# Patient Record
Sex: FEMALE | Race: WHITE | Employment: FULL TIME
[De-identification: names, ages, dates, MRNs, and addresses within clinical notes are randomized per-mention and may not be internally consistent; named-entity substitution may affect disease eponyms.]

---

## 2017-09-03 ENCOUNTER — HEALTH MAINTENANCE LETTER (OUTPATIENT)
Age: 37
End: 2017-09-03

## 2018-05-10 ENCOUNTER — OFFICE VISIT (OUTPATIENT)
Dept: ENDOCRINOLOGY | Facility: CLINIC | Age: 38
End: 2018-05-10
Payer: COMMERCIAL

## 2018-05-10 VITALS
DIASTOLIC BLOOD PRESSURE: 76 MMHG | HEART RATE: 80 BPM | WEIGHT: 214.7 LBS | BODY MASS INDEX: 35.77 KG/M2 | HEIGHT: 65 IN | RESPIRATION RATE: 16 BRPM | SYSTOLIC BLOOD PRESSURE: 122 MMHG | TEMPERATURE: 98.6 F

## 2018-05-10 DIAGNOSIS — E11.9 TYPE 2 DIABETES MELLITUS WITHOUT COMPLICATION, WITHOUT LONG-TERM CURRENT USE OF INSULIN (H): Primary | ICD-10-CM

## 2018-05-10 DIAGNOSIS — R73.9 HYPERGLYCEMIA: ICD-10-CM

## 2018-05-10 LAB
GLUCOSE SERPL-MCNC: 170 MG/DL (ref 70–99)
INSULIN SERPL-ACNC: 35.2 MU/L (ref 3–25)

## 2018-05-10 PROCEDURE — 99204 OFFICE O/P NEW MOD 45 MIN: CPT | Performed by: CLINICAL NURSE SPECIALIST

## 2018-05-10 PROCEDURE — 84681 ASSAY OF C-PEPTIDE: CPT | Performed by: CLINICAL NURSE SPECIALIST

## 2018-05-10 PROCEDURE — 82947 ASSAY GLUCOSE BLOOD QUANT: CPT | Performed by: CLINICAL NURSE SPECIALIST

## 2018-05-10 PROCEDURE — 36415 COLL VENOUS BLD VENIPUNCTURE: CPT | Performed by: CLINICAL NURSE SPECIALIST

## 2018-05-10 PROCEDURE — 83525 ASSAY OF INSULIN: CPT | Performed by: CLINICAL NURSE SPECIALIST

## 2018-05-10 RX ORDER — GLUCOSAMINE HCL/CHONDROITIN SU 500-400 MG
CAPSULE ORAL
COMMUNITY
Start: 2018-04-05 | End: 2019-05-15

## 2018-05-10 RX ORDER — LANCETS
EACH MISCELLANEOUS
COMMUNITY
Start: 2018-04-05 | End: 2019-05-15

## 2018-05-10 RX ORDER — METFORMIN HCL 500 MG
2000 TABLET, EXTENDED RELEASE 24 HR ORAL
Qty: 60 TABLET | Refills: 1 | Status: SHIPPED | OUTPATIENT
Start: 2018-05-10 | End: 2018-07-02

## 2018-05-10 RX ORDER — DIPHENHYDRAMINE HYDROCHLORIDE 25 MG/1
CAPSULE, LIQUID FILLED ORAL
COMMUNITY
Start: 2018-03-31 | End: 2019-05-15

## 2018-05-10 RX ORDER — BLOOD-GLUCOSE CONTROL, NORMAL
1 EACH MISCELLANEOUS
COMMUNITY
Start: 2018-04-05 | End: 2019-05-15

## 2018-05-10 NOTE — LETTER
5/10/2018         RE: Donna Jarvis  [address]  [facility] [address]        Dear Colleague,    Thank you for referring your patient, Donna Jarvis, to the [facility]. Please see a copy of my visit note below.    Name: Donna Jarvis  Seen at the request of no referring provider for Diabetes.  HPI:  Donna Jarvis is a 37 year old female who presents for the evaluation/management of:    1. Type 2 DM:  Originally diagnosed: recently.  History of GD x3.  Treated with glyburide during the first pregnancy but this was discontinued during the pregnancy due to frequent hypoglycemia and she was diet treated the rest of the pregnancy.  Second and third pregnancy were insulin treated.  Current Regimen: Metformin  mg qd  BS checks: 0-3 times per day.  Limited data over the past two weeks - 8 total BG readings  Average Meter Download: avg , range 174-272.    History of recurrent kidney stones, required multiple stents during pregnancy.    Complications:   Diabetes Complications  Description / Detail    Diabetic Retinopathy  Unknown, recommend eye exam   CAD / PAD  No   Neuropathy  Yes, tingling, numbness only right foot   Nephropathy / Microalbuminuria  None known   Gastroparesis  no   Hypoglycemia Unawareness  no     2. Hypertension: Blood Pressure today:   BP Readings from Last 3 Encounters:   05/10/18 122/76   01/06/16 130/72   12/29/15 115/74   .  Blood pressure medications include none  3. Lipids: Takes no medications for lipid control.     4. Prevention:  Flu Shot- recommend annually  Pneumovax- recommended  Opthalmology-No, recommend annual dilated eye exam  Dental-recommend semiannually  ASA-No  Smoking-   PMH/PSH:  Past Medical History:   Diagnosis Date     Diabetes (H)     gestational with both pregnancies     PONV (postoperative nausea and vomiting)      Renal disease     kidney stone     Past Surgical History:   Procedure Laterality Date     CYSTOSCOPY, RETROGRADES,  "INSERT STENT URETER(S), COMBINED Left 12/29/2015    Procedure: COMBINED CYSTOSCOPY, RETROGRADES, INSERT STENT URETER(S);  Surgeon: Jimi Ibrahim MD;  Location: [facility] OR     ORTHOPEDIC SURGERY       Family Hx:  History reviewed. No pertinent family history.  Thyroid disease:         DM2: Yes, father, paternal aunt, PGF, MGM      Autoimmune: DM1, SLE, RA, Vitiligo: cousin with DM1    Social Hx:  Social History     Social History     Marital status:      Spouse name: N/A     Number of children: N/A     Years of education: N/A     Occupational History     Not on file.     Social History Main Topics     Smoking status: Never Smoker     Smokeless tobacco: Never Used     Alcohol use No     Drug use: No     Sexual activity: Not on file     Other Topics Concern     Not on file     Social History Narrative          MEDICATIONS:  has a current medication list which includes the following prescription(s): acetaminophen, blood glucose calibration, blood glucose monitor system, cholecalciferol, blood glucose test strips, metformin, metformin, microlet lancets, and multivitamin, therapeutic with minerals.    ROS     ROS: 10 point ROS neg other than the symptoms noted above in the HPI.    Physical Exam   VS: /76 (BP Location: Left arm, Patient Position: Chair, Cuff Size: Adult Large)  Pulse 80  Temp 98.6  F (37  C) (Oral)  Resp 16  Ht 1.651 m (5' 5\")  Wt 97.4 kg (214 lb 11.2 oz)  LMP 04/22/2018 (Approximate)  Breastfeeding? No  BMI 35.73 kg/m2  GENERAL: AXOX3, NAD, well dressed, answering questions appropriately, appears stated age.  HEENT: OP clear, no LAD, no TM, non-tender, no exophthalmos, no proptosis, EOMI, no lig lag, no retraction  CV: RRR, no rubs, gallops, no murmurs  LUNGS: CTAB, no wheezes, rales, or rhonchi  ABDOMEN: soft, nontender, nondistended, +BS, no organomegaly  EXTREMITIES: no edema, +pulses, no rashes, no lesions  NEUROLOGY: CN grossly intact, + monofilament, + DTR upper and lower " extremity, no tremors  MSK: grossly intact  SKIN: no rashes, no lesions    LABS:  A1c:   7.3% (3/16/18)  Basic Metabolic Panel:    LFTS/Cholesterol Panel:  4/16/2018  Total cholesterol: 211  Triglycerides: 299  HDL: 38  LDL: 113  Thyroid Function:   1.32 (4/16/2018)  Urine MicroAlbumin:  Microalbumin/creatinine ratio: 11 (4/16/2018)  Vitamin D:    All pertinent notes, labs, and images personally reviewed by me.     A/P  Ms.Amber JOCELYNE Jarvis is a 37 year old here for the evaluation/management of diabetes:    1. DM2 - Uncontrolled.  Newly diagnosed.  History of GD x 3  Obtain labs to check pancreatic function  Increase Metformin as tolerated to max dose of 2000 mg/day.  Test blood sugars 1-3 times per day to include pre and post prandial readings.  Recommend she schedule an eye exam  F/u in  4-6 weeks    There is some variability among people, most will usually develop symptoms suggestive of hypoglycemia when blood glucose levels are lowered to the mid 60's. The first set of symptoms are called adrenergic. Patients may experience any of the following nervousness, sweating, intense hunger, trembling, weakness, palpitations, and difficulty speaking.   The acute management of hypoglycemia involves the rapid delivery of a source of easily absorbed sugar. Regular soda, juice, lifesavers, table sugar, are good options. 15 grams of glucose is the dose that is given, followed by an assessment of symptoms and a blood glucose check if possible. If after 10 minutes there is no improvement, another 10-15 grams should be given. This can be repeated up to three times. The equivalency of 10-15 grams of glucose (approximate servings) are: 3-5 hard candies, 3 teaspoons of sugar, or 1/2 cup of regular soda or juice.     Labs ordered today:   Orders Placed This Encounter   Procedures     Glucose     C-peptide     Insulin level       Radiology/Consults ordered today: None    All questions were answered.  The patient indicates understanding  of the above issues and agrees with the plan set forth.  Total face to face time greater than or equal to 45 minutes.       Follow-up:  4-6 weeks    Dawn Jean NP  Endocrinology  [facility]  CC:      Please call  Donna,  You do make insulin on your own - that's good to know.  Oral medications are appropriate to treat your diabetes.  Dawn Jean NP  Endocrinology         Again, thank you for allowing me to participate in the care of your patient.        Sincerely,        CYRIL Duque CNP

## 2018-05-10 NOTE — PROGRESS NOTES
Name: Donna Jarvis  Seen at the request of no referring provider for Diabetes.  HPI:  Donna Jarvis is a 37 year old female who presents for the evaluation/management of:    1. Type 2 DM:  Originally diagnosed: recently.  History of GD x3.  Treated with glyburide during the first pregnancy but this was discontinued during the pregnancy due to frequent hypoglycemia and she was diet treated the rest of the pregnancy.  Second and third pregnancy were insulin treated.  Current Regimen: Metformin  mg qd  BS checks: 0-3 times per day.  Limited data over the past two weeks - 8 total BG readings  Average Meter Download: avg , range 174-272.    History of recurrent kidney stones, required multiple stents during pregnancy.    Complications:   Diabetes Complications  Description / Detail    Diabetic Retinopathy  Unknown, recommend eye exam   CAD / PAD  No   Neuropathy  Yes, tingling, numbness only right foot   Nephropathy / Microalbuminuria  None known   Gastroparesis  no   Hypoglycemia Unawareness  no     2. Hypertension: Blood Pressure today:   BP Readings from Last 3 Encounters:   05/10/18 122/76   01/06/16 130/72   12/29/15 115/74   .  Blood pressure medications include none  3. Lipids: Takes no medications for lipid control.     4. Prevention:  Flu Shot- recommend annually  Pneumovax- recommended  Opthalmology-No, recommend annual dilated eye exam  Dental-recommend semiannually  ASA-No  Smoking-   PMH/PSH:  Past Medical History:   Diagnosis Date     Diabetes (H)     gestational with both pregnancies     PONV (postoperative nausea and vomiting)      Renal disease     kidney stone     Past Surgical History:   Procedure Laterality Date     CYSTOSCOPY, RETROGRADES, INSERT STENT URETER(S), COMBINED Left 12/29/2015    Procedure: COMBINED CYSTOSCOPY, RETROGRADES, INSERT STENT URETER(S);  Surgeon: Jimi Ibrahim MD;  Location:  OR     ORTHOPEDIC SURGERY       Family Hx:  History reviewed. No pertinent  "family history.  Thyroid disease:         DM2: Yes, father, paternal aunt, PGF, MGM      Autoimmune: DM1, SLE, RA, Vitiligo: cousin with DM1    Social Hx:  Social History     Social History     Marital status:      Spouse name: N/A     Number of children: N/A     Years of education: N/A     Occupational History     Not on file.     Social History Main Topics     Smoking status: Never Smoker     Smokeless tobacco: Never Used     Alcohol use No     Drug use: No     Sexual activity: Not on file     Other Topics Concern     Not on file     Social History Narrative          MEDICATIONS:  has a current medication list which includes the following prescription(s): acetaminophen, blood glucose calibration, blood glucose monitor system, cholecalciferol, blood glucose test strips, metformin, metformin, microlet lancets, and multivitamin, therapeutic with minerals.    ROS     ROS: 10 point ROS neg other than the symptoms noted above in the HPI.    Physical Exam   VS: /76 (BP Location: Left arm, Patient Position: Chair, Cuff Size: Adult Large)  Pulse 80  Temp 98.6  F (37  C) (Oral)  Resp 16  Ht 1.651 m (5' 5\")  Wt 97.4 kg (214 lb 11.2 oz)  LMP 04/22/2018 (Approximate)  Breastfeeding? No  BMI 35.73 kg/m2  GENERAL: AXOX3, NAD, well dressed, answering questions appropriately, appears stated age.  HEENT: OP clear, no LAD, no TM, non-tender, no exophthalmos, no proptosis, EOMI, no lig lag, no retraction  CV: RRR, no rubs, gallops, no murmurs  LUNGS: CTAB, no wheezes, rales, or rhonchi  ABDOMEN: soft, nontender, nondistended, +BS, no organomegaly  EXTREMITIES: no edema, +pulses, no rashes, no lesions  NEUROLOGY: CN grossly intact, + monofilament, + DTR upper and lower extremity, no tremors  MSK: grossly intact  SKIN: no rashes, no lesions    LABS:  A1c:   7.3% (3/16/18)  Basic Metabolic Panel:    LFTS/Cholesterol Panel:  4/16/2018  Total cholesterol: 211  Triglycerides: 299  HDL: 38  LDL: 113  Thyroid Function: "   1.32 (4/16/2018)  Urine MicroAlbumin:  Microalbumin/creatinine ratio: 11 (4/16/2018)  Vitamin D:    All pertinent notes, labs, and images personally reviewed by me.     A/P  Ms.Amber JOCELYNE Jarvis is a 37 year old here for the evaluation/management of diabetes:    1. DM2 - Uncontrolled.  Newly diagnosed.  History of GD x 3  Obtain labs to check pancreatic function  Increase Metformin as tolerated to max dose of 2000 mg/day.  Test blood sugars 1-3 times per day to include pre and post prandial readings.  Recommend she schedule an eye exam  F/u in  4-6 weeks    There is some variability among people, most will usually develop symptoms suggestive of hypoglycemia when blood glucose levels are lowered to the mid 60's. The first set of symptoms are called adrenergic. Patients may experience any of the following nervousness, sweating, intense hunger, trembling, weakness, palpitations, and difficulty speaking.   The acute management of hypoglycemia involves the rapid delivery of a source of easily absorbed sugar. Regular soda, juice, lifesavers, table sugar, are good options. 15 grams of glucose is the dose that is given, followed by an assessment of symptoms and a blood glucose check if possible. If after 10 minutes there is no improvement, another 10-15 grams should be given. This can be repeated up to three times. The equivalency of 10-15 grams of glucose (approximate servings) are: 3-5 hard candies, 3 teaspoons of sugar, or 1/2 cup of regular soda or juice.     Labs ordered today:   Orders Placed This Encounter   Procedures     Glucose     C-peptide     Insulin level       Radiology/Consults ordered today: None    All questions were answered.  The patient indicates understanding of the above issues and agrees with the plan set forth.  Total face to face time greater than or equal to 45 minutes.       Follow-up:  4-6 weeks    Dawn Jean NP  Endocrinology  [facility]  CC:

## 2018-05-10 NOTE — MR AVS SNAPSHOT
After Visit Summary   5/10/2018    Donna Jarvis    MRN: [id number]           Patient Information     Date Of Birth          1980        Visit Information        Provider Department      5/10/2018 10:00 AM Dawn Jean APRN CNP [facility]        Today's Diagnoses     Type 2 diabetes mellitus without complication, without long-term current use of insulin (H)    -  1    Hyperglycemia          Care Instructions        I recommend scheduling an eye exam once per year.    Increase metformin to two per day.  In one week, increase further to 3 tabs per day and in another week, increase to 4 tabs per day.    You can take all metformin tablets together once daily OR take 1/2 the dose with breakfast and 1/2 the dose with dinner.    I'm getting some labs today to see how much insulin your body is making on its own to make sure oral medication is appropriate.  Follow up with me 4-6 weeks.    Dawn Jean NP  Endocrinology            Follow-ups after your visit        Future tests that were ordered for you today     Open Future Orders        Priority Expected Expires Ordered    Insulin level Routine  5/10/2019 5/10/2018            Who to contact     If you have questions or need follow up information about today's clinic visit or your schedule please contact [facility] directly at [phone].  Normal or non-critical lab and imaging results will be communicated to you by MyChart, letter or phone within 4 business days after the clinic has received the results. If you do not hear from us within 7 days, please contact the clinic through MyChart or phone. If you have a critical or abnormal lab result, we will notify you by phone as soon as possible.  Submit refill requests through [employer] or call your pharmacy and they will forward the refill request to us. Please allow 3 business days for your refill to be completed.          Additional Information About Your  "Visit        MyChart Information     [employer] gives you secure access to your electronic health record. If you see a primary care provider, you can also send messages to your care team and make appointments. If you have questions, please call your primary care clinic.  If you do not have a primary care provider, please call [phone] and they will assist you.        Care EveryWhere ID     This is your Care EveryWhere ID. This could be used by other organizations to access your [facility] medical records  [id number]        Your Vitals Were     Pulse Temperature Respirations Height Last Period Breastfeeding?    80 98.6  F (37  C) (Oral) 16 1.651 m (5' 5\") 04/22/2018 (Approximate) No    BMI (Body Mass Index)                   35.73 kg/m2            Blood Pressure from Last 3 Encounters:   05/10/18 122/76   01/06/16 130/72   12/29/15 115/74    Weight from Last 3 Encounters:   05/10/18 97.4 kg (214 lb 11.2 oz)              We Performed the Following     C-peptide     Glucose          Today's Medication Changes          These changes are accurate as of 5/10/18 11:00 AM.  If you have any questions, ask your nurse or doctor.               These medicines have changed or have updated prescriptions.        Dose/Directions    * metFORMIN 500 MG tablet   Commonly known as:  GLUCOPHAGE   This may have changed:  Another medication with the same name was added. Make sure you understand how and when to take each.   Changed by:  Dawn Jean APRN CNP        Dose:  500 mg   Take 500 mg by mouth   Refills:  0       * metFORMIN 500 MG 24 hr tablet   Commonly known as:  GLUCOPHAGE-XR   This may have changed:  You were already taking a medication with the same name, and this prescription was added. Make sure you understand how and when to take each.   Used for:  Type 2 diabetes mellitus without complication, without long-term current use of insulin (H)   Changed by:  Dawn Jean APRN CNP        Dose:  2000 mg   Take 4 " tablets (2,000 mg) by mouth daily (with dinner)   Quantity:  60 tablet   Refills:  1       * Notice:  This list has 2 medication(s) that are the same as other medications prescribed for you. Read the directions carefully, and ask your doctor or other care provider to review them with you.         Where to get your medicines      These medications were sent to [facility] [address] [address] [facility] - [place], [place] - [address] [address]  [address] [address] [facility] [address] [facility], [facility] [address]     Phone:  [phone]     metFORMIN 500 MG 24 hr tablet                Primary Care Provider Office Phone # Fax #    [facility] [phone] [address] [facility] [address]        Equal Access to Services     MARCELLO KINSEY : Vianey Denise, wajesus manuel edward, qaybta kaalmada saloni, bob waters. So [facility] [phone].    ATENCIÓN: Si habla español, tiene a ibarra disposición servicios gratuitos de asistencia lingüística. Llame al [phone].    We comply with applicable federal civil rights laws and [place] laws. We do not discriminate on the basis of race, color, national origin, age, disability, sex, sexual orientation, or gender identity.            Thank you!     Thank you for choosing [facility]  for your care. Our goal is always to provide you with excellent care. Hearing back from our patients is one way we can continue to improve our services. Please take a few minutes to complete the written survey that you may receive in the mail after your visit with us. Thank you!             Your Updated Medication List - Protect others around you: Learn how to safely use, store and throw away your medicines at www.disposemymeds.org.          This list is accurate as of 5/10/18 11:00 AM.  Always use your most recent med list.                   Brand Name Dispense Instructions for use Diagnosis    blood glucose calibration Normal solution      Apply 1  drop topically        Blood Glucose Monitor System w/Device Kit      Use  to test two times a day. Use as directed        BLOOD GLUCOSE TEST STRIPS Strp      Use  to test two times a day. Use as directed. Pharmacy dispense brand based on insurance.        * metFORMIN 500 MG tablet    GLUCOPHAGE     Take 500 mg by mouth        * metFORMIN 500 MG 24 hr tablet    GLUCOPHAGE-XR    60 tablet    Take 4 tablets (2,000 mg) by mouth daily (with dinner)    Type 2 diabetes mellitus without complication, without long-term current use of insulin (H)       MICROLET LANCETS Misc      two times a day.        multivitamin, therapeutic with minerals Tabs tablet      Take 1 tablet by mouth daily        TYLENOL PO      Take 500 mg by mouth every 4-6 hours as needed for fever        VITAMIN D3 PO      Take 5,000 Units by mouth daily        * Notice:  This list has 2 medication(s) that are the same as other medications prescribed for you. Read the directions carefully, and ask your doctor or other care provider to review them with you.

## 2018-05-10 NOTE — PATIENT INSTRUCTIONS
I recommend scheduling an eye exam once per year.    Increase metformin to two per day.  In one week, increase further to 3 tabs per day and in another week, increase to 4 tabs per day.    You can take all metformin tablets together once daily OR take 1/2 the dose with breakfast and 1/2 the dose with dinner.    I'm getting some labs today to see how much insulin your body is making on its own to make sure oral medication is appropriate.  Follow up with me 4-6 weeks.    Dawn Jean NP  Endocrinology

## 2018-05-11 LAB — C PEPTIDE SERPL-MCNC: 6.8 NG/ML (ref 0.9–6.9)

## 2018-05-13 NOTE — PROGRESS NOTES
Please call  Donna,  You do make insulin on your own - that's good to know.  Oral medications are appropriate to treat your diabetes.  Dawn Jean NP  Endocrinology

## 2018-06-27 ENCOUNTER — OFFICE VISIT (OUTPATIENT)
Dept: ENDOCRINOLOGY | Facility: CLINIC | Age: 38
End: 2018-06-27
Payer: COMMERCIAL

## 2018-06-27 VITALS
SYSTOLIC BLOOD PRESSURE: 112 MMHG | DIASTOLIC BLOOD PRESSURE: 76 MMHG | BODY MASS INDEX: 34.55 KG/M2 | HEIGHT: 65 IN | WEIGHT: 207.4 LBS | HEART RATE: 84 BPM | RESPIRATION RATE: 16 BRPM | TEMPERATURE: 98.3 F

## 2018-06-27 LAB — HBA1C MFR BLD: 6.3 % (ref 0–5.6)

## 2018-06-27 PROCEDURE — 83036 HEMOGLOBIN GLYCOSYLATED A1C: CPT | Performed by: CLINICAL NURSE SPECIALIST

## 2018-06-27 PROCEDURE — 99213 OFFICE O/P EST LOW 20 MIN: CPT | Performed by: CLINICAL NURSE SPECIALIST

## 2018-06-27 PROCEDURE — 36415 COLL VENOUS BLD VENIPUNCTURE: CPT | Performed by: CLINICAL NURSE SPECIALIST

## 2018-06-27 NOTE — MR AVS SNAPSHOT
After Visit Summary   6/27/2018    Donna Jarvis    MRN: [id number]           Patient Information     Date Of Birth          1980        Visit Information        Provider Department      6/27/2018 12:30 PM Dawn Jean APRN CNP [facility]        Today's Diagnoses     Uncontrolled type 2 diabetes mellitus without complication, without long-term current use of insulin (H)    -  1       Follow-ups after your visit        Follow-up notes from your care team     Return in about 3 months (around 9/27/2018).      Who to contact     If you have questions or need follow up information about today's clinic visit or your schedule please contact [facility] directly at [phone].  Normal or non-critical lab and imaging results will be communicated to you by [employer], letter or phone within 4 business days after the clinic has received the results. If you do not hear from us within 7 days, please contact the clinic through [employer] or phone. If you have a critical or abnormal lab result, we will notify you by phone as soon as possible.  Submit refill requests through [email or web address] or call your pharmacy and they will forward the refill request to us. Please allow 3 business days for your refill to be completed.          Additional Information About Your Visit        MyChart Information     [email or web address] gives you secure access to your electronic health record. If you see a primary care provider, you can also send messages to your care team and make appointments. If you have questions, please call your primary care clinic.  If you do not have a primary care provider, please call [phone] and they will assist you.        Care EveryWhere ID     This is your Care EveryWhere ID. This could be used by other organizations to access your [facility] medical records  [id number]        Your Vitals Were     Pulse Temperature Respirations Height Last Period Breastfeeding?    84 98.3  F  "(36.8  C) (Oral) 16 1.651 m (5' 5\") 06/25/2018 No    BMI (Body Mass Index)                   34.51 kg/m2            Blood Pressure from Last 3 Encounters:   06/27/18 112/76   05/10/18 122/76   01/06/16 130/72    Weight from Last 3 Encounters:   06/27/18 94.1 kg (207 lb 6.4 oz)   05/10/18 97.4 kg (214 lb 11.2 oz)              We Performed the Following     Hemoglobin A1c          Today's Medication Changes          These changes are accurate as of 6/27/18 11:59 PM.  If you have any questions, ask your nurse or doctor.               Start taking these medicines.        Dose/Directions    Semaglutide 0.25 or 0.5 MG/DOSE Sopn   Commonly known as:  OZEMPIC   Used for:  Uncontrolled type 2 diabetes mellitus without complication, without long-term current use of insulin (H)   Started by:  Dawn Jean APRN CNP        Dose:  0.5 mg   Inject 0.5 mg Subcutaneous once a week Inject 0.025 mg weekly x 4 doses then increase to 0.5 mg weekly   Quantity:  1 pen   Refills:  3         These medicines have changed or have updated prescriptions.        Dose/Directions    metFORMIN 500 MG 24 hr tablet   Commonly known as:  GLUCOPHAGE-XR   This may have changed:  Another medication with the same name was removed. Continue taking this medication, and follow the directions you see here.   Used for:  Type 2 diabetes mellitus without complication, without long-term current use of insulin (H)   Changed by:  Dawn Jean APRN CNP        Dose:  2000 mg   Take 4 tablets (2,000 mg) by mouth daily (with dinner)   Quantity:  60 tablet   Refills:  1            Where to get your medicines      These medications were sent to [facility] [address] [facility] - [place], [place] - [address]  [facility]  [address]  [address] , [facility] [address]     Phone:  [phone]     Semaglutide 0.25 or 0.5 MG/DOSE Sopn                Primary Care Provider Office Phone # Fax #    [facility] [phone]       [address] " [facility] [address]        Equal Access to Services     KAYE KINSEY : Hadii floresita guerrero el Sozahra, waaxda luqadaha, qaybta kaalmaashu guallpa, bob alstonlynnromel waters. So [facility] [phone].    ATENCIÓN: Si habla español, tiene a ibarra disposición servicios gratuitos de asistencia lingüística. Palomaame al [phone].    We comply with applicable federal civil rights laws and [place] laws. We do not discriminate on the basis of race, color, national origin, age, disability, sex, sexual orientation, or gender identity.            Thank you!     Thank you for choosing [facility]  for your care. Our goal is always to provide you with excellent care. Hearing back from our patients is one way we can continue to improve our services. Please take a few minutes to complete the written survey that you may receive in the mail after your visit with us. Thank you!             Your Updated Medication List - Protect others around you: Learn how to safely use, store and throw away your medicines at www.disposemymeds.org.          This list is accurate as of 6/27/18 11:59 PM.  Always use your most recent med list.                   Brand Name Dispense Instructions for use Diagnosis    blood glucose calibration Normal solution      Apply 1 drop topically        Blood Glucose Monitor System w/Device Kit      Use  to test two times a day. Use as directed        BLOOD GLUCOSE TEST STRIPS Strp      Use  to test two times a day. Use as directed. Pharmacy dispense brand based on insurance.        metFORMIN 500 MG 24 hr tablet    GLUCOPHAGE-XR    60 tablet    Take 4 tablets (2,000 mg) by mouth daily (with dinner)    Type 2 diabetes mellitus without complication, without long-term current use of insulin (H)       MICROLET LANCETS Misc      two times a day.        multivitamin, therapeutic with minerals Tabs tablet      Take 1 tablet by mouth daily        Semaglutide 0.25 or 0.5 MG/DOSE Sopn     OZEMPIC    1 pen    Inject 0.5 mg Subcutaneous once a week Inject 0.025 mg weekly x 4 doses then increase to 0.5 mg weekly    Uncontrolled type 2 diabetes mellitus without complication, without long-term current use of insulin (H)       TYLENOL PO      Take 500 mg by mouth every 4-6 hours as needed for fever        VITAMIN D3 PO      Take 5,000 Units by mouth daily

## 2018-06-27 NOTE — PROGRESS NOTES
Name: Donna Jarvis  Seen at the request of no referring provider for Diabetes (Last seen 5/10/2018).  HPI:  Donna Jarvis is a 37 year old female who presents for the management of:    1. Type 2 DM:  Originally diagnosed: recently.  History of GD x3.  Treated with glyburide during the first pregnancy but this was discontinued during the pregnancy due to frequent hypoglycemia and she was diet treated the rest of the pregnancy.  Second and third pregnancy were insulin treated.    Current Regimen: Metformin XR 2000 mg qd    BS checks: 0-3 times per day.    Meter Download: avg , range 139-236 (most readings are fasting - 215, 203, 193, 139, 215, 193)    History of recurrent kidney stones, required multiple stents during pregnancy.    Complications:   Diabetes Complications  Description / Detail    Diabetic Retinopathy     CAD / PAD  No   Neuropathy  Yes, tingling, numbness only right foot   Nephropathy / Microalbuminuria  None known   Gastroparesis  no   Hypoglycemia Unawareness  no     2. Hypertension: Blood Pressure today:   BP Readings from Last 3 Encounters:   06/27/18 112/76   05/10/18 122/76   01/06/16 130/72   .  Blood pressure medications include none  3. Lipids: Takes no medications for lipid control.     4. Prevention:  Flu Shot- recommend annually  Pneumovax- recommended  Opthalmology-No, recommend annual dilated eye exam  Dental-recommend semiannually  ASA-No  Smoking-   PMH/PSH:  Past Medical History:   Diagnosis Date     Diabetes (H)     gestational with both pregnancies     PONV (postoperative nausea and vomiting)      Renal disease     kidney stone     Past Surgical History:   Procedure Laterality Date     CYSTOSCOPY, RETROGRADES, INSERT STENT URETER(S), COMBINED Left 12/29/2015    Procedure: COMBINED CYSTOSCOPY, RETROGRADES, INSERT STENT URETER(S);  Surgeon: Jimi Ibrahim MD;  Location: [facility] OR     ORTHOPEDIC SURGERY       Family Hx:  No family history on file.  Thyroid disease:        "  DM2: Yes, father, paternal aunt, PGF, MGM      Autoimmune: DM1, SLE, RA, Vitiligo: cousin with DM1    Social Hx:  Social History     Social History     Marital status:      Spouse name: N/A     Number of children: N/A     Years of education: N/A     Occupational History     Not on file.     Social History Main Topics     Smoking status: Never Smoker     Smokeless tobacco: Never Used     Alcohol use No     Drug use: No     Sexual activity: Not on file     Other Topics Concern     Not on file     Social History Narrative          MEDICATIONS:  has a current medication list which includes the following prescription(s): semaglutide, acetaminophen, blood glucose calibration, blood glucose monitor system, cholecalciferol, blood glucose test strips, metformin, microlet lancets, and multivitamin, therapeutic with minerals.    ROS     ROS: 10 point ROS neg other than the symptoms noted above in the HPI.    Physical Exam   VS: /76 (BP Location: Left arm, Patient Position: Chair, Cuff Size: Adult Large)  Pulse 84  Temp 98.3  F (36.8  C) (Oral)  Resp 16  Ht 1.651 m (5' 5\")  Wt 94.1 kg (207 lb 6.4 oz)  LMP 06/25/2018  Breastfeeding? No  BMI 34.51 kg/m2  GENERAL: AXOX3, NAD, well dressed, answering questions appropriately, appears stated age.  HEENT: no exophthalmos, no proptosis, no lig lag, no retraction  CV: regular rate.  LUNGS: normal respiratory effort  NEUROLOGY: CN grossly intact, no tremors  MSK: grossly intact    LABS:  A1c:   7.3% (3/16/18)  Component      Latest Ref Rng & Units 3/16/2018 6/27/2018   Hemoglobin A1C      0 - 5.6 % 7.3% (H) 6.3 (H)     Basic Metabolic Panel:  Creatinine 0.58 (3/16/2018)  Component  Latest Ref Rng & Units 5/10/2018   Glucose  70 - 99 mg/dL 170 (H)   C-Peptide   0.9 - 6.9 ng/mL 6.8   Insulin   3 - 25 mU/L 35.2 (H)     LFTS/Cholesterol Panel:  4/16/2018  Total cholesterol: 211  Triglycerides: 299  HDL: 38  LDL: 113    Thyroid Function:   1.32 (4/16/2018)    Urine " MicroAlbumin:  Microalbumin/creatinine ratio: 11 (4/16/2018)    Vitamin D:    All pertinent notes, labs, and images personally reviewed by me.     A/P  Ms.Donna Jarvis is a 37 year old here for the evaluation/management of diabetes:    1. DM2 - A1c controlled.  Fasting BG readings elevated.  Newly diagnosed.  History of GD x 3  Start Ozempic 0.025 mg weekly x 1 month then increase to 0.5 mg weekly as tolerated.  Continue Metformin XR 2000 mg/day.  Test blood sugars 1-3 times per day to include pre and post prandial readings.  Considering pregnancy in the future, not until end of the year or later.  Recommend she delay pregnancy until blood sugars are well controlled.  Will plan to stop Ozempic prior to conception.  Will likely require insulin with pregnancy as last two pregnancies were insulin treated.    F/u in 3 months.    Labs ordered today:   Orders Placed This Encounter   Procedures     Hemoglobin A1c       Radiology/Consults ordered today: None    All questions were answered.  The patient indicates understanding of the above issues and agrees with the plan set forth.  Total face to face time greater than or equal to 15 minutes.       Follow-up:  3 months    Dawn Jean NP  Endocrinology  [facility]  CC:

## 2018-06-27 NOTE — LETTER
6/27/2018         RE: Donna Jarvis  [address]  [facility] [address]        Dear Colleague,    Thank you for referring your patient, Donna Jarvis, to the [facility]. Please see a copy of my visit note below.    Name: Donna Jarvis  Seen at the request of no referring provider for Diabetes (Last seen 5/10/2018).  HPI:  Donna Jarvis is a 37 year old female who presents for the management of:    1. Type 2 DM:  Originally diagnosed: recently.  History of GD x3.  Treated with glyburide during the first pregnancy but this was discontinued during the pregnancy due to frequent hypoglycemia and she was diet treated the rest of the pregnancy.  Second and third pregnancy were insulin treated.    Current Regimen: Metformin XR 2000 mg qd    BS checks: 0-3 times per day.    Meter Download: avg , range 139-236 (most readings are fasting - 215, 203, 193, 139, 215, 193)    History of recurrent kidney stones, required multiple stents during pregnancy.    Complications:   Diabetes Complications  Description / Detail    Diabetic Retinopathy     CAD / PAD  No   Neuropathy  Yes, tingling, numbness only right foot   Nephropathy / Microalbuminuria  None known   Gastroparesis  no   Hypoglycemia Unawareness  no     2. Hypertension: Blood Pressure today:   BP Readings from Last 3 Encounters:   06/27/18 112/76   05/10/18 122/76   01/06/16 130/72   .  Blood pressure medications include none  3. Lipids: Takes no medications for lipid control.     4. Prevention:  Flu Shot- recommend annually  Pneumovax- recommended  Opthalmology-No, recommend annual dilated eye exam  Dental-recommend semiannually  ASA-No  Smoking-   PMH/PSH:  Past Medical History:   Diagnosis Date     Diabetes (H)     gestational with both pregnancies     PONV (postoperative nausea and vomiting)      Renal disease     kidney stone     Past Surgical History:   Procedure Laterality Date     CYSTOSCOPY, RETROGRADES, INSERT STENT URETER(S),  "COMBINED Left 12/29/2015    Procedure: COMBINED CYSTOSCOPY, RETROGRADES, INSERT STENT URETER(S);  Surgeon: Jimi Ibrahim MD;  Location: [facility] OR     ORTHOPEDIC SURGERY       Family Hx:  No family history on file.  Thyroid disease:         DM2: Yes, father, paternal aunt, PGF, MGM      Autoimmune: DM1, SLE, RA, Vitiligo: cousin with DM1    Social Hx:  Social History     Social History     Marital status:      Spouse name: N/A     Number of children: N/A     Years of education: N/A     Occupational History     Not on file.     Social History Main Topics     Smoking status: Never Smoker     Smokeless tobacco: Never Used     Alcohol use No     Drug use: No     Sexual activity: Not on file     Other Topics Concern     Not on file     Social History Narrative          MEDICATIONS:  has a current medication list which includes the following prescription(s): semaglutide, acetaminophen, blood glucose calibration, blood glucose monitor system, cholecalciferol, blood glucose test strips, metformin, microlet lancets, and multivitamin, therapeutic with minerals.    ROS     ROS: 10 point ROS neg other than the symptoms noted above in the HPI.    Physical Exam   VS: /76 (BP Location: Left arm, Patient Position: Chair, Cuff Size: Adult Large)  Pulse 84  Temp 98.3  F (36.8  C) (Oral)  Resp 16  Ht 1.651 m (5' 5\")  Wt 94.1 kg (207 lb 6.4 oz)  LMP 06/25/2018  Breastfeeding? No  BMI 34.51 kg/m2  GENERAL: AXOX3, NAD, well dressed, answering questions appropriately, appears stated age.  HEENT: no exophthalmos, no proptosis, no lig lag, no retraction  CV: regular rate.  LUNGS: normal respiratory effort  NEUROLOGY: CN grossly intact, no tremors  MSK: grossly intact    LABS:  A1c:   7.3% (3/16/18)  Component      Latest Ref Rng & Units 3/16/2018 6/27/2018   Hemoglobin A1C      0 - 5.6 % 7.3% (H) 6.3 (H)     Basic Metabolic Panel:  Creatinine 0.58 (3/16/2018)  Component  Latest Ref Rng & Units 5/10/2018   Glucose  " 70 - 99 mg/dL 170 (H)   C-Peptide   0.9 - 6.9 ng/mL 6.8   Insulin   3 - 25 mU/L 35.2 (H)     LFTS/Cholesterol Panel:  4/16/2018  Total cholesterol: 211  Triglycerides: 299  HDL: 38  LDL: 113    Thyroid Function:   1.32 (4/16/2018)    Urine MicroAlbumin:  Microalbumin/creatinine ratio: 11 (4/16/2018)    Vitamin D:    All pertinent notes, labs, and images personally reviewed by me.     A/P  Ms.Amber JOCELYNE Jarvis is a 37 year old here for the evaluation/management of diabetes:    1. DM2 - A1c controlled.  Fasting BG readings elevated.  Newly diagnosed.  History of GD x 3  Start Ozempic 0.025 mg weekly x 1 month then increase to 0.5 mg weekly as tolerated.  Continue Metformin XR 2000 mg/day.  Test blood sugars 1-3 times per day to include pre and post prandial readings.  Considering pregnancy in the future, not until end of the year or later.  Recommend she delay pregnancy until blood sugars are well controlled.  Will plan to stop Ozempic prior to conception.  Will likely require insulin with pregnancy as last two pregnancies were insulin treated.    F/u in 3 months.    Labs ordered today:   Orders Placed This Encounter   Procedures     Hemoglobin A1c       Radiology/Consults ordered today: None    All questions were answered.  The patient indicates understanding of the above issues and agrees with the plan set forth.  Total face to face time greater than or equal to 15 minutes.       Follow-up:  3 months    Dawn Jean NP  Endocrinology  [facility]  CC:      Again, thank you for allowing me to participate in the care of your patient.        Sincerely,        CYRIL Duque CNP

## 2018-06-28 NOTE — PROGRESS NOTES
Donna,  Your recent A1c has improved from 7.3% down to 6.3%.  I still recommend starting the Ozempic because your finger stick glucose levels are running pretty high.  Let me know how the Ozempic is going once you start.  Here's a copy of the A1c result for your records.  Let me know if you have any questions.  Dawn Jean NP  Endocrinology

## 2018-07-01 ENCOUNTER — MYC MEDICAL ADVICE (OUTPATIENT)
Dept: ENDOCRINOLOGY | Facility: CLINIC | Age: 38
End: 2018-07-01

## 2018-07-01 DIAGNOSIS — E11.9 TYPE 2 DIABETES MELLITUS WITHOUT COMPLICATION, WITHOUT LONG-TERM CURRENT USE OF INSULIN (H): ICD-10-CM

## 2018-07-02 RX ORDER — METFORMIN HCL 500 MG
2000 TABLET, EXTENDED RELEASE 24 HR ORAL
Qty: 360 TABLET | Refills: 3 | Status: SHIPPED | OUTPATIENT
Start: 2018-07-02 | End: 2019-05-15

## 2018-07-02 NOTE — TELEPHONE ENCOUNTER
The following [employer] message was sent:    Hi there,    With the July 4th holiday, Dawn will be back in the office on Thursday, July 5th.  She will be able to address your note then.  Just wanted to keep you in the loop so you would know when to expect a reply.    Thank you,  Sintia Coon M.A.  Endocrinology  [facility]  [phone] [facility]

## 2018-07-11 ENCOUNTER — MYC MEDICAL ADVICE (OUTPATIENT)
Dept: ENDOCRINOLOGY | Facility: CLINIC | Age: 38
End: 2018-07-11

## 2018-08-06 ENCOUNTER — MYC MEDICAL ADVICE (OUTPATIENT)
Dept: ENDOCRINOLOGY | Facility: CLINIC | Age: 38
End: 2018-08-06

## 2018-08-11 ENCOUNTER — MYC MEDICAL ADVICE (OUTPATIENT)
Dept: ENDOCRINOLOGY | Facility: CLINIC | Age: 38
End: 2018-08-11

## 2018-08-11 DIAGNOSIS — E11.9 TYPE 2 DIABETES MELLITUS WITHOUT COMPLICATION, WITHOUT LONG-TERM CURRENT USE OF INSULIN (H): ICD-10-CM

## 2018-08-14 PROBLEM — E11.9 TYPE 2 DIABETES MELLITUS WITHOUT COMPLICATION (H): Status: ACTIVE | Noted: 2018-08-14

## 2018-09-20 ENCOUNTER — TRANSFERRED RECORDS (OUTPATIENT)
Dept: HEALTH INFORMATION MANAGEMENT | Facility: CLINIC | Age: 38
End: 2018-09-20

## 2018-11-16 DIAGNOSIS — E11.9 TYPE 2 DIABETES MELLITUS WITHOUT COMPLICATION, WITHOUT LONG-TERM CURRENT USE OF INSULIN (H): ICD-10-CM

## 2018-11-17 NOTE — TELEPHONE ENCOUNTER
"Requested Prescriptions   Pending Prescriptions Disp Refills     metFORMIN (GLUCOPHAGE-XR) 500 MG 24 hr tablet [Pharmacy Med Name: METFORMIN  MG TABLET] 60 tablet 1    Last Written Prescription Date:  7/2/18  Last Fill Quantity: 360,  # refills: 3   Last Office Visit: 6/27/2018 Ted    F/u in 3 months.    Future Office Visit:      Sig: TAKE 4 TABLETS (2,000 MG) BY MOUTH DAILY (WITH DINNER)    Biguanide Agents Failed    11/16/2018  3:58 PM       Failed - Patient has documented LDL within the past 12 mos.    No lab results found.         Failed - Patient has had a Microalbumin in the past 15 mos.    No lab results found.         Passed - Blood pressure less than 140/90 in past 6 months    BP Readings from Last 3 Encounters:   06/27/18 112/76   05/10/18 122/76   01/06/16 130/72                Passed - Patient is age 10 or older       Passed - Patient has documented A1c within the specified period of time.    If HgbA1C is 8 or greater, it needs to be on file within the past 3 months.  If less than 8, must be on file within the past 6 months.     Recent Labs   Lab Test  06/27/18   1229   A1C  6.3*            Passed - Patient's CR is NOT>1.4 OR Patient's EGFR is NOT<45 within past 12 mos.    Recent Labs   Lab Test  01/06/16   0536   GFRESTIMATED  >90  Non  GFR Calc     GFRESTBLACK  >90   GFR Calc         Recent Labs   Lab Test  01/06/16   0536   CR  0.56            Passed - Patient does NOT have a diagnosis of CHF.       Passed - Patient is not pregnant       Passed - Patient has not had a positive pregnancy test within the past 12 mos.        Passed - Recent (6 mo) or future (30 days) visit within the authorizing provider's specialty    Patient had office visit in the last 6 months or has a visit in the next 30 days with authorizing provider or within the authorizing provider's specialty.  See \"Patient Info\" tab in inbasket, or \"Choose Columns\" in Meds & Orders section of the refill " encounter.

## 2018-11-19 RX ORDER — METFORMIN HCL 500 MG
TABLET, EXTENDED RELEASE 24 HR ORAL
Status: SHIPPED
Start: 2018-11-19 | End: 2019-05-15

## 2019-05-15 ENCOUNTER — OFFICE VISIT (OUTPATIENT)
Dept: ENDOCRINOLOGY | Facility: CLINIC | Age: 39
End: 2019-05-15
Payer: COMMERCIAL

## 2019-05-15 VITALS
RESPIRATION RATE: 12 BRPM | SYSTOLIC BLOOD PRESSURE: 112 MMHG | TEMPERATURE: 98.5 F | BODY MASS INDEX: 33.61 KG/M2 | HEART RATE: 96 BPM | DIASTOLIC BLOOD PRESSURE: 74 MMHG | WEIGHT: 202 LBS

## 2019-05-15 DIAGNOSIS — E11.9 TYPE 2 DIABETES MELLITUS WITHOUT COMPLICATION, WITHOUT LONG-TERM CURRENT USE OF INSULIN (H): Primary | ICD-10-CM

## 2019-05-15 LAB — HBA1C MFR BLD: 6.3 % (ref 0–5.6)

## 2019-05-15 PROCEDURE — 83036 HEMOGLOBIN GLYCOSYLATED A1C: CPT | Performed by: CLINICAL NURSE SPECIALIST

## 2019-05-15 PROCEDURE — 80053 COMPREHEN METABOLIC PANEL: CPT | Performed by: CLINICAL NURSE SPECIALIST

## 2019-05-15 PROCEDURE — 99213 OFFICE O/P EST LOW 20 MIN: CPT | Performed by: CLINICAL NURSE SPECIALIST

## 2019-05-15 PROCEDURE — 99207 C FOOT EXAM  NO CHARGE: CPT | Performed by: CLINICAL NURSE SPECIALIST

## 2019-05-15 PROCEDURE — 36415 COLL VENOUS BLD VENIPUNCTURE: CPT | Performed by: CLINICAL NURSE SPECIALIST

## 2019-05-15 PROCEDURE — 82043 UR ALBUMIN QUANTITATIVE: CPT | Performed by: CLINICAL NURSE SPECIALIST

## 2019-05-15 RX ORDER — METFORMIN HCL 500 MG
2000 TABLET, EXTENDED RELEASE 24 HR ORAL
Qty: 360 TABLET | Refills: 3 | Status: SHIPPED | OUTPATIENT
Start: 2019-05-15 | End: 2019-11-16

## 2019-05-15 NOTE — PATIENT INSTRUCTIONS
Your A1c goal is less than 7.0%.  Your last two A1c levels are excellent, your diabetes is well controlled.    Your eye exam will be due 9/20/2019.    You are due for fasting cholesterol test - I placed an order, you can make a fasting lab appointment    Component      Latest Ref Rng & Units 6/27/2018 5/15/2019   Hemoglobin A1C      0 - 5.6 % 6.3 (H) 6.3 (H)     Component  Latest Ref Rng & Units 5/10/2018   Glucose  70 - 99 mg/dL 170 (H)   C-Peptide   0.9 - 6.9 ng/mL 6.8   Insulin   3 - 25 mU/L 35.2 (H)       I recommend The Diabetes Code.    You can make a lab appointment in 3 months to recheck the A1c if you like - otherwise I recommend rechecking the A1c again in 6 months for sure.    Dawn Jean NP  Endocrinology

## 2019-05-15 NOTE — PROGRESS NOTES
Name: Donna Jarvis  Seen at the request of no referring provider for Diabetes (Last seen 6/27/2018).  HPI:  Donna Jarvis is a 38 year old female who presents for the management of:    1. Type 2 DM:  Originally diagnosed: recently.  History of GD x3.  Treated with glyburide during the first pregnancy but this was discontinued during the pregnancy due to frequent hypoglycemia and she was diet treated the rest of the pregnancy.  Second and third pregnancy were insulin treated.    Current Regimen: Metformin XR 2000 mg every day  July 2018: Had uncontrolled diarrhea from Ozempic during the night while she was asleep.      BS checks: 2 times per day.    Meter Download: 30-day avg , range 126-209     History of recurrent kidney stones, required multiple stents during pregnancy.    Considering one more pregnancy at some point in the future    Complications:   Diabetes Complications  Description / Detail    Diabetic Retinopathy  No retinopathy - last exam 9/20/2018   CAD / PAD  No   Neuropathy  Yes, tingling, numbness - intermittent   Nephropathy / Microalbuminuria  None known   Gastroparesis  no   Hypoglycemia Unawareness  no     2. Hypertension: Blood Pressure today:   BP Readings from Last 3 Encounters:   05/15/19 112/74   06/27/18 112/76   05/10/18 122/76   .  Blood pressure medications include none  3. Lipids: Takes no medications for lipid control.     4. Prevention:  Flu Shot- recommend annually  Pneumovax- recommended  Opthalmology-No, recommend annual dilated eye exam  Dental-recommend semiannually  ASA-No  Smoking-   PMH/PSH:  Past Medical History:   Diagnosis Date     Diabetes (H)     gestational with both pregnancies     PONV (postoperative nausea and vomiting)      Renal disease     kidney stone     Past Surgical History:   Procedure Laterality Date     CYSTOSCOPY, RETROGRADES, INSERT STENT URETER(S), COMBINED Left 12/29/2015    Procedure: COMBINED CYSTOSCOPY, RETROGRADES, INSERT STENT URETER(S);   Surgeon: Jimi Ibrahim MD;  Location: [facility]     ORTHOPEDIC SURGERY       Family Hx:  History reviewed. No pertinent family history.  Thyroid disease:         DM2: Yes, father, paternal aunt, PGF, MGM      Autoimmune: DM1, SLE, RA, Vitiligo: cousin with DM1    Social Hx:  Social History     Socioeconomic History     Marital status:      Spouse name: Not on file     Number of children: Not on file     Years of education: Not on file     Highest education level: Not on file   Occupational History     Not on file   Social Needs     Financial resource strain: Not on file     Food insecurity:     Worry: Not on file     Inability: Not on file     Transportation needs:     Medical: Not on file     Non-medical: Not on file   Tobacco Use     Smoking status: Never Smoker     Smokeless tobacco: Never Used   Substance and Sexual Activity     Alcohol use: No     Drug use: No     Sexual activity: Not on file   Lifestyle     Physical activity:     Days per week: Not on file     Minutes per session: Not on file     Stress: Not on file   Relationships     Social connections:     Talks on phone: Not on file     Gets together: Not on file     Attends Sikh service: Not on file     Active member of club or organization: Not on file     Attends meetings of clubs or organizations: Not on file     Relationship status: Not on file     Intimate partner violence:     Fear of current or ex partner: Not on file     Emotionally abused: Not on file     Physically abused: Not on file     Forced sexual activity: Not on file   Other Topics Concern     Parent/sibling w/ CABG, MI or angioplasty before 65F 55M? Not Asked   Social History Narrative     Not on file          MEDICATIONS:  has a current medication list which includes the following prescription(s): acetaminophen, blood glucose calibration, blood glucose monitor system, cholecalciferol, blood glucose test strips, metformin, microlet lancets, multivitamin w/minerals, and  sitagliptin.    ROS     ROS: 10 point ROS neg other than the symptoms noted above in the HPI.    Physical Exam   VS: /74 (BP Location: Left arm, Patient Position: Chair, Cuff Size: Adult Regular)   Pulse 96   Temp 98.5  F (36.9  C) (Oral)   Resp 12   Wt 91.6 kg (202 lb)   BMI 33.61 kg/m    GENERAL: AXOX3, NAD, well dressed, answering questions appropriately, appears stated age.  HEENT: no exophthalmos, no proptosis, no lig lag, no retraction  CV: regular rate.  LUNGS: normal respiratory effort  NEUROLOGY: CN grossly intact, no tremors  MSK: grossly intact  EXTREMITIES:  Feet with 2+ pulses, 10-gram plantar sensation 6/6 bilaterally, no open lesions.  LABS:  A1c:   Component      Latest Ref Rng & Units 6/27/2018 5/15/2019   Hemoglobin A1C      0 - 5.6 % 6.3 (H) 6.3 (H)      Basic Metabolic Panel:  Creatinine 0.58 (3/16/2018)  Component  Latest Ref Rng & Units 5/10/2018   Glucose  70 - 99 mg/dL 170 (H)   C-Peptide   0.9 - 6.9 ng/mL 6.8   Insulin   3 - 25 mU/L 35.2 (H)     LFTS/Cholesterol Panel:  4/16/2018  Total cholesterol: 211  Triglycerides: 299  HDL: 38  LDL: 113    Thyroid Function:   1.32 (4/16/2018)    Urine MicroAlbumin:  Microalbumin/creatinine ratio: 11 (4/16/2018)    Vitamin D:    All pertinent notes, labs, and images personally reviewed by me.     A/P  Ms.Amber JOCELYNE Jarvis is a 38 year old here for the evaluation/management of diabetes:    1. DM2 - A1c controlled.  Fasting BG readings elevated.  Newly diagnosed in 2018.  History of GD x 3  Continue Metformin XR 2000 mg/day.  Test blood sugars 1-3 times per day to include pre and post prandial readings.  Will likely require insulin with pregnancy as last two pregnancies were insulin treated.    F/u in 6 months.    Labs ordered today:   Orders Placed This Encounter   Procedures     FOOT EXAM     Hemoglobin A1c     Albumin Random Urine Quantitative with Creat Ratio     Lipid panel reflex to direct LDL Fasting     Comprehensive metabolic panel        Radiology/Consults ordered today: C FOOT EXAM  NO CHARGE    All questions were answered.  The patient indicates understanding of the above issues and agrees with the plan set forth.  Total face to face time greater than or equal to 15 minutes.       Follow-up:  6 months    Dawn Jean NP  Endocrinology  [facility]  CC:

## 2019-05-15 NOTE — LETTER
5/15/2019         RE: Donna Jarvis  [address]  [place] [facility] [place] [address]        Dear Colleague,    Thank you for referring your patient, Donna Jarvis, to the [facility]. Please see a copy of my visit note below.    Name: Donna Jarvis  Seen at the request of no referring provider for Diabetes (Last seen 6/27/2018).  HPI:  Donna Jarvis is a 38 year old female who presents for the management of:    1. Type 2 DM:  Originally diagnosed: recently.  History of GD x3.  Treated with glyburide during the first pregnancy but this was discontinued during the pregnancy due to frequent hypoglycemia and she was diet treated the rest of the pregnancy.  Second and third pregnancy were insulin treated.    Current Regimen: Metformin XR 2000 mg every day  July 2018: Had uncontrolled diarrhea from Ozempic during the night while she was asleep.      BS checks: 2 times per day.    Meter Download: 30-day avg , range 126-209     History of recurrent kidney stones, required multiple stents during pregnancy.    Considering one more pregnancy at some point in the future    Complications:   Diabetes Complications  Description / Detail    Diabetic Retinopathy  No retinopathy - last exam 9/20/2018   CAD / PAD  No   Neuropathy  Yes, tingling, numbness - intermittent   Nephropathy / Microalbuminuria  None known   Gastroparesis  no   Hypoglycemia Unawareness  no     2. Hypertension: Blood Pressure today:   BP Readings from Last 3 Encounters:   05/15/19 112/74   06/27/18 112/76   05/10/18 122/76   .  Blood pressure medications include none  3. Lipids: Takes no medications for lipid control.     4. Prevention:  Flu Shot- recommend annually  Pneumovax- recommended  Opthalmology-No, recommend annual dilated eye exam  Dental-recommend semiannually  ASA-No  Smoking-   PMH/PSH:  Past Medical History:   Diagnosis Date     Diabetes (H)     gestational with both pregnancies     PONV (postoperative nausea and vomiting)       Renal disease     kidney stone     Past Surgical History:   Procedure Laterality Date     CYSTOSCOPY, RETROGRADES, INSERT STENT URETER(S), COMBINED Left 12/29/2015    Procedure: COMBINED CYSTOSCOPY, RETROGRADES, INSERT STENT URETER(S);  Surgeon: Jimi Ibrahim MD;  Location: [facility] OR     ORTHOPEDIC SURGERY       Family Hx:  History reviewed. No pertinent family history.  Thyroid disease:         DM2: Yes, father, paternal aunt, PGF, MGM      Autoimmune: DM1, SLE, RA, Vitiligo: cousin with DM1    Social Hx:  Social History     Socioeconomic History     Marital status:      Spouse name: Not on file     Number of children: Not on file     Years of education: Not on file     Highest education level: Not on file   Occupational History     Not on file   Social Needs     Financial resource strain: Not on file     Food insecurity:     Worry: Not on file     Inability: Not on file     Transportation needs:     Medical: Not on file     Non-medical: Not on file   Tobacco Use     Smoking status: Never Smoker     Smokeless tobacco: Never Used   Substance and Sexual Activity     Alcohol use: No     Drug use: No     Sexual activity: Not on file   Lifestyle     Physical activity:     Days per week: Not on file     Minutes per session: Not on file     Stress: Not on file   Relationships     Social connections:     Talks on phone: Not on file     Gets together: Not on file     Attends Mandaeism service: Not on file     Active member of club or organization: Not on file     Attends meetings of clubs or organizations: Not on file     Relationship status: Not on file     Intimate partner violence:     Fear of current or ex partner: Not on file     Emotionally abused: Not on file     Physically abused: Not on file     Forced sexual activity: Not on file   Other Topics Concern     Parent/sibling w/ CABG, MI or angioplasty before 65F 55M? Not Asked   Social History Narrative     Not on file          MEDICATIONS:  has a  current medication list which includes the following prescription(s): acetaminophen, blood glucose calibration, blood glucose monitor system, cholecalciferol, blood glucose test strips, metformin, microlet lancets, multivitamin w/minerals, and sitagliptin.    ROS     ROS: 10 point ROS neg other than the symptoms noted above in the HPI.    Physical Exam   VS: /74 (BP Location: Left arm, Patient Position: Chair, Cuff Size: Adult Regular)   Pulse 96   Temp 98.5  F (36.9  C) (Oral)   Resp 12   Wt 91.6 kg (202 lb)   BMI 33.61 kg/m     GENERAL: AXOX3, NAD, well dressed, answering questions appropriately, appears stated age.  HEENT: no exophthalmos, no proptosis, no lig lag, no retraction  CV: regular rate.  LUNGS: normal respiratory effort  NEUROLOGY: CN grossly intact, no tremors  MSK: grossly intact  EXTREMITIES:  Feet with 2+ pulses, 10-gram plantar sensation 6/6 bilaterally, no open lesions.  LABS:  A1c:   Component      Latest Ref Rng & Units 6/27/2018 5/15/2019   Hemoglobin A1C      0 - 5.6 % 6.3 (H) 6.3 (H)      Basic Metabolic Panel:  Creatinine 0.58 (3/16/2018)  Component  Latest Ref Rng & Units 5/10/2018   Glucose  70 - 99 mg/dL 170 (H)   C-Peptide   0.9 - 6.9 ng/mL 6.8   Insulin   3 - 25 mU/L 35.2 (H)     LFTS/Cholesterol Panel:  4/16/2018  Total cholesterol: 211  Triglycerides: 299  HDL: 38  LDL: 113    Thyroid Function:   1.32 (4/16/2018)    Urine MicroAlbumin:  Microalbumin/creatinine ratio: 11 (4/16/2018)    Vitamin D:    All pertinent notes, labs, and images personally reviewed by me.     A/P  Ms.Amber JOCELYNE Jarvis is a 38 year old here for the evaluation/management of diabetes:    1. DM2 - A1c controlled.  Fasting BG readings elevated.  Newly diagnosed in 2018.  History of GD x 3  Continue Metformin XR 2000 mg/day.  Test blood sugars 1-3 times per day to include pre and post prandial readings.  Will likely require insulin with pregnancy as last two pregnancies were insulin treated.    F/u in 6  months.    Labs ordered today:   Orders Placed This Encounter   Procedures     FOOT EXAM     Hemoglobin A1c     Albumin Random Urine Quantitative with Creat Ratio     Lipid panel reflex to direct LDL Fasting     Comprehensive metabolic panel       Radiology/Consults ordered today: C FOOT EXAM  NO CHARGE    All questions were answered.  The patient indicates understanding of the above issues and agrees with the plan set forth.  Total face to face time greater than or equal to 15 minutes.       Follow-up:  6 months    Dawn Jean NP  Endocrinology  [facility]  CC:      Again, thank you for allowing me to participate in the care of your patient.        Sincerely,        CYRIL Duque CNP

## 2019-05-16 LAB
CREAT UR-MCNC: 198 MG/DL
MICROALBUMIN UR-MCNC: 25 MG/L
MICROALBUMIN/CREAT UR: 12.68 MG/G CR (ref 0–25)

## 2019-05-16 NOTE — RESULT ENCOUNTER NOTE
Donna,  Good news! There was no abnormal protein in your urine.  Congratulations on your excellent A1c.  Keep up the good work!  Here's a copy of the results for your records.  Dawn Jean NP  Endocrinology

## 2019-05-17 LAB
ALBUMIN SERPL-MCNC: 4.1 G/DL (ref 3.4–5)
ALP SERPL-CCNC: 87 U/L (ref 40–150)
ALT SERPL W P-5'-P-CCNC: 40 U/L (ref 0–50)
ANION GAP SERPL CALCULATED.3IONS-SCNC: 11 MMOL/L (ref 3–14)
AST SERPL W P-5'-P-CCNC: 18 U/L (ref 0–45)
BILIRUB SERPL-MCNC: 0.5 MG/DL (ref 0.2–1.3)
BUN SERPL-MCNC: 13 MG/DL (ref 7–30)
CALCIUM SERPL-MCNC: 9.5 MG/DL (ref 8.5–10.1)
CHLORIDE SERPL-SCNC: 103 MMOL/L (ref 94–109)
CO2 SERPL-SCNC: 25 MMOL/L (ref 20–32)
CREAT SERPL-MCNC: 0.64 MG/DL (ref 0.52–1.04)
GFR SERPL CREATININE-BSD FRML MDRD: >90 ML/MIN/{1.73_M2}
GLUCOSE SERPL-MCNC: 114 MG/DL (ref 70–99)
POTASSIUM SERPL-SCNC: 4.5 MMOL/L (ref 3.4–5.3)
PROT SERPL-MCNC: 7.8 G/DL (ref 6.8–8.8)
SODIUM SERPL-SCNC: 139 MMOL/L (ref 133–144)

## 2019-05-17 NOTE — RESULT ENCOUNTER NOTE
Donna,  Your liver and kidney function are normal.  Let me know if you have any questions.  Dawn Jean NP  Endocrinology

## 2019-11-07 ENCOUNTER — HEALTH MAINTENANCE LETTER (OUTPATIENT)
Age: 39
End: 2019-11-07

## 2019-11-15 ENCOUNTER — OFFICE VISIT (OUTPATIENT)
Dept: ENDOCRINOLOGY | Facility: CLINIC | Age: 39
End: 2019-11-15
Payer: COMMERCIAL

## 2019-11-15 VITALS
WEIGHT: 202 LBS | HEART RATE: 79 BPM | OXYGEN SATURATION: 96 % | DIASTOLIC BLOOD PRESSURE: 80 MMHG | BODY MASS INDEX: 33.61 KG/M2 | SYSTOLIC BLOOD PRESSURE: 122 MMHG | TEMPERATURE: 98 F

## 2019-11-15 DIAGNOSIS — Z23 NEED FOR PROPHYLACTIC VACCINATION AND INOCULATION AGAINST INFLUENZA: ICD-10-CM

## 2019-11-15 DIAGNOSIS — E11.9 TYPE 2 DIABETES MELLITUS WITHOUT COMPLICATION, WITHOUT LONG-TERM CURRENT USE OF INSULIN (H): Primary | ICD-10-CM

## 2019-11-15 LAB — HBA1C MFR BLD: 6.2 % (ref 0–5.6)

## 2019-11-15 PROCEDURE — 36415 COLL VENOUS BLD VENIPUNCTURE: CPT | Performed by: CLINICAL NURSE SPECIALIST

## 2019-11-15 PROCEDURE — 90686 IIV4 VACC NO PRSV 0.5 ML IM: CPT | Performed by: CLINICAL NURSE SPECIALIST

## 2019-11-15 PROCEDURE — 99213 OFFICE O/P EST LOW 20 MIN: CPT | Mod: 25 | Performed by: CLINICAL NURSE SPECIALIST

## 2019-11-15 PROCEDURE — 83036 HEMOGLOBIN GLYCOSYLATED A1C: CPT | Performed by: CLINICAL NURSE SPECIALIST

## 2019-11-15 PROCEDURE — 90471 IMMUNIZATION ADMIN: CPT | Performed by: CLINICAL NURSE SPECIALIST

## 2019-11-15 NOTE — PATIENT INSTRUCTIONS
"  Component      Latest Ref Rng & Units 6/27/2018 5/15/2019 11/15/2019   Hemoglobin A1C      0 - 5.6 % 6.3 (H) 6.3 (H) 6.2 (H)     This is a possible treatment for diabetic neuropathy -     Alpha-lipoic acid -- One of the mechanisms implicated in the pathogenesis of diabetic neuropathy is increased oxidative stress. As a result, antioxidants have been studied for their potential to diminish oxidative stress, improve the underlying pathophysiology of neuropathy, and reduce pain. (See \"Pathogenesis and prevention of diabetic polyneuropathy\".)  Alpha-lipoic acid (ALA), a potent antioxidant, has been associated with benefit for symptomatic diabetic neuropathy in several prospective, placebo-controlled studies [44-47]. In the [facility] 1 trial, daily intravenous ALA for three weeks was associated with reduced pain, paresthesia, and numbness [45].  In the [facility] 2 trial, 181 patients with diabetes and symptomatic distal symmetric polyneuropathy were randomly assigned to one of three doses of orally administered ALA (600, 1200, or 1800 mg daily) or to placebo for five weeks [47]. The following observations were reported:  ?All three doses of oral ALA treatment were associated with a statistically significant reduction in the primary outcome measure, the neuropathy total symptom score (a summation of stabbing pain, burning pain, paresthesia, and asleep numbness), compared with placebo [47]. The benefit of ALA did not differ by dose.  ?A clinically meaningful response, defined as ?50 percent reduction in neuropathic symptoms, was observed in 50 to 62 percent of patients treated with ALA versus 26 percent with placebo, a difference that was statistically significant.  ?The optimal dose of ALA was 600 mg once daily, as higher doses were limited by increasing adverse events (nausea, vomiting, and vertigo) without increasing efficacy.  The strength of these findings is limited by the short duration of this trial [47]. There are no " long-term studies that assess the effect of alpha-lipoic acid on progression of neuropathy.  However, based upon these data, we suggest treatment with oral  mg once daily for patients with symptomatic painful diabetic polyneuropathy who are refractory to or intolerant of antidepressants (eg, amitriptyline, duloxetine, venlafaxine) or anticonvulsants (eg, pregabalin) that have been established as beneficial for this condition.    Schedule a fasting lab only appointment in May.      Follow up in 6 months

## 2019-11-15 NOTE — PROGRESS NOTES
Name: Donna Jarvsi  Seen at the request of no referring provider for Diabetes (Last seen 5/15/2019).  HPI:  Donna aJrvis is a 39 year old female who presents for the management of:    1. Type 2 DM:  Originally diagnosed: 2018  History of GD x3.  Treated with glyburide during the first pregnancy but this was discontinued during the pregnancy due to frequent hypoglycemia and she was diet treated the rest of the pregnancy.  Second and third pregnancy were insulin treated.    Current Regimen: Metformin XR 2000 mg every day  July 2018: Had uncontrolled diarrhea from Ozempic during the night while she was asleep - this was immediately discontinued.    BS checks: 0-2 times per day.    Meter Download: Vaxart. 30-day avg , 90-day average 180, range 149-236     History of recurrent kidney stones, required multiple stents during pregnancy.    Considering one more pregnancy at some point in the future    Complications:   Diabetes Complications  Description / Detail    Diabetic Retinopathy  No retinopathy - last exam 9/20/2018, upcoming exam scheduled 11/2019   CAD / PAD  No   Neuropathy   ?, numbness and pain/discomfort in feet - intermittent   Nephropathy / Microalbuminuria  None known   Gastroparesis  no   Hypoglycemia Unawareness  no     2. Hypertension: Blood Pressure today:   BP Readings from Last 3 Encounters:   11/15/19 122/80   05/15/19 112/74   06/27/18 112/76   .  Blood pressure medications include none  3. Lipids: Takes no medications for lipid control.     4. Prevention:  Flu Shot- today  Pneumovax- recommended, declined  Opthalmology- annually  Dental-recommend semiannually  ASA-No  Smoking- No  PMH/PSH:  Past Medical History:   Diagnosis Date     Diabetes (H)     gestational with both pregnancies     PONV (postoperative nausea and vomiting)      Renal disease     kidney stone     Past Surgical History:   Procedure Laterality Date     CYSTOSCOPY, RETROGRADES, INSERT STENT URETER(S), COMBINED Left  12/29/2015    Procedure: COMBINED CYSTOSCOPY, RETROGRADES, INSERT STENT URETER(S);  Surgeon: Jimi Ibrahim MD;  Location: [facility] OR     ORTHOPEDIC SURGERY       Family Hx:  No family history on file.  Thyroid disease: yes, uncle, ? An aunt        DM2: Yes, father, paternal aunt, PGF, MGM      Autoimmune: DM1, SLE, RA, Vitiligo: cousin with DM1    Social Hx:  Social History     Socioeconomic History     Marital status:      Spouse name: Not on file     Number of children: Not on file     Years of education: Not on file     Highest education level: Not on file   Occupational History     Not on file   Social Needs     Financial resource strain: Not on file     Food insecurity:     Worry: Not on file     Inability: Not on file     Transportation needs:     Medical: Not on file     Non-medical: Not on file   Tobacco Use     Smoking status: Never Smoker     Smokeless tobacco: Never Used   Substance and Sexual Activity     Alcohol use: No     Drug use: No     Sexual activity: Not on file   Lifestyle     Physical activity:     Days per week: Not on file     Minutes per session: Not on file     Stress: Not on file   Relationships     Social connections:     Talks on phone: Not on file     Gets together: Not on file     Attends Lutheran service: Not on file     Active member of club or organization: Not on file     Attends meetings of clubs or organizations: Not on file     Relationship status: Not on file     Intimate partner violence:     Fear of current or ex partner: Not on file     Emotionally abused: Not on file     Physically abused: Not on file     Forced sexual activity: Not on file   Other Topics Concern     Parent/sibling w/ CABG, MI or angioplasty before 65F 55M? Not Asked   Social History Narrative     Not on file          MEDICATIONS:  has a current medication list which includes the following prescription(s): acetaminophen, cholecalciferol, metformin, and multivitamin w/minerals.    ROS     ROS:  10 point ROS neg other than the symptoms noted above in the HPI.    Physical Exam   VS: /80 (BP Location: Right arm, Patient Position: Chair, Cuff Size: Adult Large)   Pulse 79   Temp 98  F (36.7  C) (Oral)   Wt 91.6 kg (202 lb)   SpO2 96%   BMI 33.61 kg/m    GENERAL: AXOX3, NAD, well dressed, answering questions appropriately, appears stated age.  HEENT: no exophthalmos, no proptosis, no lig lag, no retraction  CV: regular rate.  LUNGS: normal respiratory effort  NEUROLOGY: CN grossly intact, no tremors  MSK: grossly intact  EXTREMITIES: No edema    LABS:  A1c:   Component      Latest Ref Rng & Units 6/27/2018 5/15/2019 11/15/2019   Hemoglobin A1C      0 - 5.6 % 6.3 (H) 6.3 (H) 6.2 (H)     Basic Metabolic Panel:  !COMPREHENSIVE Latest Ref Rng & Units 5/15/2019   SODIUM 133 - 144 mmol/L 139   POTASSIUM 3.4 - 5.3 mmol/L 4.5   CHLORIDE 94 - 109 mmol/L 103   BUN 7 - 30 mg/dL 13   Creatinine 0.52 - 1.04 mg/dL 0.64   Glucose 70 - 99 mg/dL 114 (H)   ANION GAP 3 - 14 mmol/L 11   CALCIUM 8.5 - 10.1 mg/dL 9.5   ALBUMIN 3.4 - 5.0 g/dL 4.1     LFTS/Cholesterol Panel:  4/16/2018  Total cholesterol: 211  Triglycerides: 299  HDL: 38  LDL: 113  !LIPID/HEPATIC Latest Ref Rng & Units 5/15/2019   AST 0 - 45 U/L 18   ALT 0 - 50 U/L 40     Thyroid Function:   1.32 (4/16/2018)    Urine MicroAlbumin:  Component      Latest Ref Rng & Units 5/15/2019   Creatinine Urine      mg/dL 198   Albumin Urine mg/L      mg/L 25   Albumin Urine mg/g Cr      0 - 25 mg/g Cr 12.68     Vitamin D:    All pertinent notes, labs, and images personally reviewed by me.     A/P  Ms.Amber JOCELYNE Jarvis is a 39 year old here for the management of diabetes:    1. DM2 - A1c controlled.   Newly diagnosed in 2018.  History of GD x 3  Possible diabetic neuropathy.  Continue Metformin XR 2000 mg/day.  Sample Alexis sensor provided today.  She can wear this for 2 weeks and see if she detects any particular glucose patterns that are concerning.  Will likely require  insulin with pregnancy as last two pregnancies were insulin treated.    F/u in 6 months - fasting labs prior.    Labs ordered today:   Orders Placed This Encounter   Procedures     INFLUENZA VACCINE IM > 6 MONTHS VALENT IIV4 [[id number]]     Vaccine Administration, Initial [[id number]]     Hemoglobin A1c     Lipid panel reflex to direct LDL Fasting     Albumin Random Urine Quantitative with Creat Ratio     **Comprehensive metabolic panel FUTURE anytime     **TSH with free T4 reflex FUTURE anytime       Radiology/Consults ordered today: HC FLU VAC PRESRV FREE QUAD SPLIT VIR 3+YRS IM  HC VACCINE ADMINISTRATION, INITIAL    All questions were answered.  The patient indicates understanding of the above issues and agrees with the plan set forth.  Total face to face time greater than or equal to 15 minutes.       Follow-up:  6 months    Dawn Jean NP  Endocrinology  [facility]  CC:

## 2019-11-15 NOTE — LETTER
11/15/2019         RE: Donna Jarvis  [address]  [facility] [address]        Dear Colleague,    Thank you for referring your patient, Donna Jarvis, to the [facility]. Please see a copy of my visit note below.    Name: Donna Jarvis  Seen at the request of no referring provider for Diabetes (Last seen 5/15/2019).  HPI:  Donna Jarvis is a 39 year old female who presents for the management of:    1. Type 2 DM:  Originally diagnosed: 2018  History of GD x3.  Treated with glyburide during the first pregnancy but this was discontinued during the pregnancy due to frequent hypoglycemia and she was diet treated the rest of the pregnancy.  Second and third pregnancy were insulin treated.    Current Regimen: Metformin XR 2000 mg every day  July 2018: Had uncontrolled diarrhea from Ozempic during the night while she was asleep - this was immediately discontinued.    BS checks: 0-2 times per day.    Meter Download: [employer]. 30-day avg , 90-day average 180, range 149-236     History of recurrent kidney stones, required multiple stents during pregnancy.    Considering one more pregnancy at some point in the future    Complications:   Diabetes Complications  Description / Detail    Diabetic Retinopathy  No retinopathy - last exam 9/20/2018, upcoming exam scheduled 11/2019   CAD / PAD  No   Neuropathy   ?, numbness and pain/discomfort in feet - intermittent   Nephropathy / Microalbuminuria  None known   Gastroparesis  no   Hypoglycemia Unawareness  no     2. Hypertension: Blood Pressure today:   BP Readings from Last 3 Encounters:   11/15/19 122/80   05/15/19 112/74   06/27/18 112/76   .  Blood pressure medications include none  3. Lipids: Takes no medications for lipid control.     4. Prevention:  Flu Shot- today  Pneumovax- recommended, declined  Opthalmology- annually  Dental-recommend semiannually  ASA-No  Smoking- No  PMH/PSH:  Past Medical History:   Diagnosis Date     Diabetes (H)      gestational with both pregnancies     PONV (postoperative nausea and vomiting)      Renal disease     kidney stone     Past Surgical History:   Procedure Laterality Date     CYSTOSCOPY, RETROGRADES, INSERT STENT URETER(S), COMBINED Left 12/29/2015    Procedure: COMBINED CYSTOSCOPY, RETROGRADES, INSERT STENT URETER(S);  Surgeon: Jimi Ibrahim MD;  Location: [facility] OR     ORTHOPEDIC SURGERY       Family Hx:  No family history on file.  Thyroid disease: yes, uncle, ? An aunt        DM2: Yes, father, paternal aunt, PGF, MGM      Autoimmune: DM1, SLE, RA, Vitiligo: cousin with DM1    Social Hx:  Social History     Socioeconomic History     Marital status:      Spouse name: Not on file     Number of children: Not on file     Years of education: Not on file     Highest education level: Not on file   Occupational History     Not on file   Social Needs     Financial resource strain: Not on file     Food insecurity:     Worry: Not on file     Inability: Not on file     Transportation needs:     Medical: Not on file     Non-medical: Not on file   Tobacco Use     Smoking status: Never Smoker     Smokeless tobacco: Never Used   Substance and Sexual Activity     Alcohol use: No     Drug use: No     Sexual activity: Not on file   Lifestyle     Physical activity:     Days per week: Not on file     Minutes per session: Not on file     Stress: Not on file   Relationships     Social connections:     Talks on phone: Not on file     Gets together: Not on file     Attends [employer] service: Not on file     Active member of club or organization: Not on file     Attends meetings of clubs or organizations: Not on file     Relationship status: Not on file     Intimate partner violence:     Fear of current or ex partner: Not on file     Emotionally abused: Not on file     Physically abused: Not on file     Forced sexual activity: Not on file   Other Topics Concern     Parent/sibling w/ CABG, MI or angioplasty before 65F 55M?  Not Asked   Social History Narrative     Not on file          MEDICATIONS:  has a current medication list which includes the following prescription(s): acetaminophen, cholecalciferol, metformin, and multivitamin w/minerals.    ROS     ROS: 10 point ROS neg other than the symptoms noted above in the HPI.    Physical Exam   VS: /80 (BP Location: Right arm, Patient Position: Chair, Cuff Size: Adult Large)   Pulse 79   Temp 98  F (36.7  C) (Oral)   Wt 91.6 kg (202 lb)   SpO2 96%   BMI 33.61 kg/m     GENERAL: AXOX3, NAD, well dressed, answering questions appropriately, appears stated age.  HEENT: no exophthalmos, no proptosis, no lig lag, no retraction  CV: regular rate.  LUNGS: normal respiratory effort  NEUROLOGY: CN grossly intact, no tremors  MSK: grossly intact  EXTREMITIES: No edema    LABS:  A1c:   Component      Latest Ref Rng & Units 6/27/2018 5/15/2019 11/15/2019   Hemoglobin A1C      0 - 5.6 % 6.3 (H) 6.3 (H) 6.2 (H)     Basic Metabolic Panel:  !COMPREHENSIVE Latest Ref Rng & Units 5/15/2019   SODIUM 133 - 144 mmol/L 139   POTASSIUM 3.4 - 5.3 mmol/L 4.5   CHLORIDE 94 - 109 mmol/L 103   BUN 7 - 30 mg/dL 13   Creatinine 0.52 - 1.04 mg/dL 0.64   Glucose 70 - 99 mg/dL 114 (H)   ANION GAP 3 - 14 mmol/L 11   CALCIUM 8.5 - 10.1 mg/dL 9.5   ALBUMIN 3.4 - 5.0 g/dL 4.1     LFTS/Cholesterol Panel:  4/16/2018  Total cholesterol: 211  Triglycerides: 299  HDL: 38  LDL: 113  !LIPID/HEPATIC Latest Ref Rng & Units 5/15/2019   AST 0 - 45 U/L 18   ALT 0 - 50 U/L 40     Thyroid Function:   1.32 (4/16/2018)    Urine MicroAlbumin:  Component      Latest Ref Rng & Units 5/15/2019   Creatinine Urine      mg/dL 198   Albumin Urine mg/L      mg/L 25   Albumin Urine mg/g Cr      0 - 25 mg/g Cr 12.68     Vitamin D:    All pertinent notes, labs, and images personally reviewed by me.     A/P  Ms.Amber JOCELYNE Jarvis is a 39 year old here for the management of diabetes:    1. DM2 - A1c controlled.   Newly diagnosed in 2018.  History of  GD x 3  Possible diabetic neuropathy.  Continue Metformin XR 2000 mg/day.  Sample Alexis sensor provided today.  She can wear this for 2 weeks and see if she detects any particular glucose patterns that are concerning.  Will likely require insulin with pregnancy as last two pregnancies were insulin treated.    F/u in 6 months - fasting labs prior.    Labs ordered today:   Orders Placed This Encounter   Procedures     INFLUENZA VACCINE IM > 6 MONTHS VALENT IIV4 [21327]     Vaccine Administration, Initial [25947]     Hemoglobin A1c     Lipid panel reflex to direct LDL Fasting     Albumin Random Urine Quantitative with Creat Ratio     **Comprehensive metabolic panel FUTURE anytime     **TSH with free T4 reflex FUTURE anytime       Radiology/Consults ordered today: HC FLU VAC PRESRV FREE QUAD SPLIT VIR 3+YRS IM  HC VACCINE ADMINISTRATION, INITIAL    All questions were answered.  The patient indicates understanding of the above issues and agrees with the plan set forth.  Total face to face time greater than or equal to 15 minutes.       Follow-up:  6 months    Dawn Jean NP  Endocrinology  [facility]  CC:      Again, thank you for allowing me to participate in the care of your patient.        Sincerely,        CYRIL Duque CNP

## 2019-11-16 RX ORDER — METFORMIN HCL 500 MG
2000 TABLET, EXTENDED RELEASE 24 HR ORAL
Qty: 360 TABLET | Refills: 3 | Status: SHIPPED | OUTPATIENT
Start: 2019-11-16 | End: 2020-11-13

## 2019-11-27 ENCOUNTER — TRANSFERRED RECORDS (OUTPATIENT)
Dept: HEALTH INFORMATION MANAGEMENT | Facility: CLINIC | Age: 39
End: 2019-11-27

## 2020-02-23 ENCOUNTER — HEALTH MAINTENANCE LETTER (OUTPATIENT)
Age: 40
End: 2020-02-23

## 2020-05-14 ENCOUNTER — TELEPHONE (OUTPATIENT)
Dept: ENDOCRINOLOGY | Facility: CLINIC | Age: 40
End: 2020-05-14

## 2020-05-14 DIAGNOSIS — E11.9 TYPE 2 DIABETES MELLITUS WITHOUT COMPLICATION, WITHOUT LONG-TERM CURRENT USE OF INSULIN (H): Primary | ICD-10-CM

## 2020-05-14 NOTE — TELEPHONE ENCOUNTER
I placed a new lab order for the A1c.  She has open 'future' orders for TSH, lipids, urine microalbumin, and comp panel.  She can get all of these done at the same time.  Remind her to fast 8-10 hours beforehand.  Thanks,  Dawn Jean NP  Endocrinology

## 2020-05-14 NOTE — TELEPHONE ENCOUNTER
Spoke with patient.  All recommendations given.  Lab appointment scheduled for tomorrow, May 15th.  Sintia Coon CMA on 5/14/2020 at 1:52 PM

## 2020-05-14 NOTE — TELEPHONE ENCOUNTER
Left a voicemail for patient to return my call to [phone] and ask for Sintia in the [facility] station.  Sintia Coon CMA on 5/14/2020 at 1:30 PM

## 2020-05-15 DIAGNOSIS — E11.9 TYPE 2 DIABETES MELLITUS WITHOUT COMPLICATION, WITHOUT LONG-TERM CURRENT USE OF INSULIN (H): ICD-10-CM

## 2020-05-15 LAB — HBA1C MFR BLD: 6.7 % (ref 0–5.6)

## 2020-05-15 PROCEDURE — 82043 UR ALBUMIN QUANTITATIVE: CPT | Performed by: CLINICAL NURSE SPECIALIST

## 2020-05-15 PROCEDURE — 83721 ASSAY OF BLOOD LIPOPROTEIN: CPT | Mod: 59 | Performed by: CLINICAL NURSE SPECIALIST

## 2020-05-15 PROCEDURE — 83036 HEMOGLOBIN GLYCOSYLATED A1C: CPT | Performed by: CLINICAL NURSE SPECIALIST

## 2020-05-15 PROCEDURE — 80053 COMPREHEN METABOLIC PANEL: CPT | Performed by: CLINICAL NURSE SPECIALIST

## 2020-05-15 PROCEDURE — 84443 ASSAY THYROID STIM HORMONE: CPT | Performed by: CLINICAL NURSE SPECIALIST

## 2020-05-15 PROCEDURE — 36415 COLL VENOUS BLD VENIPUNCTURE: CPT | Performed by: CLINICAL NURSE SPECIALIST

## 2020-05-15 PROCEDURE — 80061 LIPID PANEL: CPT | Performed by: CLINICAL NURSE SPECIALIST

## 2020-05-15 NOTE — RESULT ENCOUNTER NOTE
Hi Donna,  Your A1c is still in a good range.  It's a little higher than the last A1c of 6.2% but still considered good diabetes control as long as it is under 7.0%.   I'll let you know the rest of your results once they are available.  Dawn Jean NP  Endocrinology

## 2020-05-16 LAB
ALBUMIN SERPL-MCNC: 3.6 G/DL (ref 3.4–5)
ALP SERPL-CCNC: 77 U/L (ref 40–150)
ALT SERPL W P-5'-P-CCNC: 58 U/L (ref 0–50)
ANION GAP SERPL CALCULATED.3IONS-SCNC: 7 MMOL/L (ref 3–14)
AST SERPL W P-5'-P-CCNC: 43 U/L (ref 0–45)
BILIRUB SERPL-MCNC: 0.6 MG/DL (ref 0.2–1.3)
BUN SERPL-MCNC: 10 MG/DL (ref 7–30)
CALCIUM SERPL-MCNC: 8.8 MG/DL (ref 8.5–10.1)
CHLORIDE SERPL-SCNC: 102 MMOL/L (ref 94–109)
CHOLEST SERPL-MCNC: 211 MG/DL
CO2 SERPL-SCNC: 26 MMOL/L (ref 20–32)
CREAT SERPL-MCNC: 0.61 MG/DL (ref 0.52–1.04)
CREAT UR-MCNC: 118 MG/DL
GFR SERPL CREATININE-BSD FRML MDRD: >90 ML/MIN/{1.73_M2}
GLUCOSE SERPL-MCNC: 145 MG/DL (ref 70–99)
HDLC SERPL-MCNC: 33 MG/DL
LDLC SERPL CALC-MCNC: ABNORMAL MG/DL
LDLC SERPL DIRECT ASSAY-MCNC: 95 MG/DL
MICROALBUMIN UR-MCNC: 13 MG/L
MICROALBUMIN/CREAT UR: 11.1 MG/G CR (ref 0–25)
NONHDLC SERPL-MCNC: 178 MG/DL
POTASSIUM SERPL-SCNC: 4.1 MMOL/L (ref 3.4–5.3)
PROT SERPL-MCNC: 7.7 G/DL (ref 6.8–8.8)
SODIUM SERPL-SCNC: 135 MMOL/L (ref 133–144)
TRIGL SERPL-MCNC: 716 MG/DL
TSH SERPL DL<=0.005 MIU/L-ACNC: 2.86 MU/L (ref 0.4–4)

## 2020-05-17 ENCOUNTER — MYC MEDICAL ADVICE (OUTPATIENT)
Dept: ENDOCRINOLOGY | Facility: CLINIC | Age: 40
End: 2020-05-17

## 2020-05-21 NOTE — RESULT ENCOUNTER NOTE
Dakota Thompson,  Here's an official copy of your recent lab results for your records.  Dawn Jean NP  Endocrinology

## 2020-08-04 ENCOUNTER — MYC MEDICAL ADVICE (OUTPATIENT)
Dept: ENDOCRINOLOGY | Facility: CLINIC | Age: 40
End: 2020-08-04

## 2020-08-04 DIAGNOSIS — E11.9 TYPE 2 DIABETES MELLITUS WITHOUT COMPLICATION, WITHOUT LONG-TERM CURRENT USE OF INSULIN (H): Primary | ICD-10-CM

## 2020-11-12 DIAGNOSIS — E11.9 TYPE 2 DIABETES MELLITUS WITHOUT COMPLICATION, WITHOUT LONG-TERM CURRENT USE OF INSULIN (H): ICD-10-CM

## 2020-11-12 NOTE — TELEPHONE ENCOUNTER
Routing refill request to provider for review/approval because:  Labs out of range:  A1C  Patient needs to be seen because it has been more than 1 year since last office visit.      Routing to team to assist with scheduling endo appointment and/or annual visit.     Rosemary Douglas RN Flex

## 2020-11-13 RX ORDER — METFORMIN HCL 500 MG
2000 TABLET, EXTENDED RELEASE 24 HR ORAL
Qty: 360 TABLET | Refills: 3 | Status: SHIPPED | OUTPATIENT
Start: 2020-11-13

## 2020-11-20 ENCOUNTER — TELEPHONE (OUTPATIENT)
Dept: ENDOCRINOLOGY | Facility: CLINIC | Age: 40
End: 2020-11-20

## 2020-11-20 NOTE — TELEPHONE ENCOUNTER
Called patient to set up diabetic follow up.  Metformin was recently approved for refills however patient was due to be seen.  Telephone visit was scheduled for 12/11/2020.  Patient will plan to email her [facility] BG meter report.  Sintia Coon CMA on 11/20/2020 at 3:28 PM

## 2020-12-06 ENCOUNTER — HEALTH MAINTENANCE LETTER (OUTPATIENT)
Age: 40
End: 2020-12-06

## 2020-12-11 ENCOUNTER — VIRTUAL VISIT (OUTPATIENT)
Dept: ENDOCRINOLOGY | Facility: CLINIC | Age: 40
End: 2020-12-11
Payer: COMMERCIAL

## 2020-12-11 DIAGNOSIS — E11.9 TYPE 2 DIABETES MELLITUS WITHOUT COMPLICATION, WITHOUT LONG-TERM CURRENT USE OF INSULIN (H): Primary | ICD-10-CM

## 2020-12-11 PROCEDURE — 99213 OFFICE O/P EST LOW 20 MIN: CPT | Mod: 95 | Performed by: CLINICAL NURSE SPECIALIST

## 2020-12-11 NOTE — LETTER
"    12/11/2020         RE: Donna Jarvis  [address]  [facility] [address]        Dear Colleague,    Thank you for referring your patient, Donna Jarvis, to the [facility]. Please see a copy of my visit note below.    Donna Jarvis is a 40 year old female who is being evaluated via a billable telephone visit.      The patient has been notified of following:     \"This telephone visit will be conducted via a call between you and your physician/provider. We have found that certain health care needs can be provided without the need for a physical exam.  This service lets us provide the care you need with a short phone conversation.  If a prescription is necessary we can send it directly to your pharmacy.  If lab work is needed we can place an order for that and you can then stop by our lab to have the test done at a later time.    Telephone visits are billed at different rates depending on your insurance coverage. During this emergency period, for some insurers they may be billed the same as an in-person visit.  Please reach out to your insurance provider with any questions.    If during the course of the call the physician/provider feels a telephone visit is not appropriate, you will not be charged for this service.\"    Patient has given verbal consent for Telephone visit?  Yes    What phone number would you like to be contacted at? [phone]    How would you like to obtain your AVS? MyChart    Name: Donna Jarvis  Seen at the request of no referring provider for Diabetes (Last seen 11/15/2019).  HPI:  Donna Jarvis is a 40 year old female who presents via phone visit for the management of:    1. Type 2 DM:  Originally diagnosed: 2018  History of GD x3.  Treated with glyburide during the first pregnancy but this was discontinued during the pregnancy due to frequent hypoglycemia and she was diet treated the rest of the pregnancy.  Second and third pregnancy were insulin " treated.    Current Regimen: Metformin XR 2000 mg every day  July 2018: Had uncontrolled diarrhea from Ozempic during the night while she was asleep - this was immediately discontinued.    BS checks: 0-2 times per day.    Meter Download: [employer]. 30-day avg , 90-day average 153, range      History of recurrent kidney stones, required multiple stents during pregnancy.    Considering one more pregnancy at some point in the future    Complications:   Diabetes Complications  Description / Detail    Diabetic Retinopathy  No retinopathy - last exam 11/27/2019   CAD / PAD  No   Neuropathy   + numbness and pain/discomfort in feet - podiatry eval unremarkable.   Nephropathy / Microalbuminuria  None known   Gastroparesis  no   Hypoglycemia Unawareness  no     2. Blood Pressure:   BP Readings from Last 3 Encounters:   11/15/19 122/80   05/15/19 112/74   06/27/18 112/76   Blood pressure medications include none  3. Lipids: Takes no medications for lipid control.     4. Prevention:  Flu Shot- thinks she had her flu shot this year but doesn't remember exactly when or where  Pneumovax- recommended, declined  Opthalmology- annually  Dental-recommend semiannually  ASA-No  Smoking- No  PMH/PSH:  Past Medical History:   Diagnosis Date     Diabetes (H)     gestational with both pregnancies     PONV (postoperative nausea and vomiting)      Renal disease     kidney stone     Past Surgical History:   Procedure Laterality Date     CYSTOSCOPY, RETROGRADES, INSERT STENT URETER(S), COMBINED Left 12/29/2015    Procedure: COMBINED CYSTOSCOPY, RETROGRADES, INSERT STENT URETER(S);  Surgeon: Jimi Ibrahim MD;  Location: [facility] OR     ORTHOPEDIC SURGERY       Family Hx:  No family history on file.  Thyroid disease: yes, uncle, ? An aunt        DM2: Yes, father, paternal aunt, PGF, MGM      Autoimmune: DM1, SLE, RA, Vitiligo: cousin with DM1    Social Hx:  Social History     Socioeconomic History     Marital status:       Spouse name: Not on file     Number of children: Not on file     Years of education: Not on file     Highest education level: Not on file   Occupational History     Not on file   Social Needs     Financial resource strain: Not on file     Food insecurity     Worry: Not on file     Inability: Not on file     Transportation needs     Medical: Not on file     Non-medical: Not on file   Tobacco Use     Smoking status: Never Smoker     Smokeless tobacco: Never Used   Substance and Sexual Activity     Alcohol use: No     Drug use: No     Sexual activity: Not on file   Lifestyle     Physical activity     Days per week: Not on file     Minutes per session: Not on file     Stress: Not on file   Relationships     Social connections     Talks on phone: Not on file     Gets together: Not on file     Attends Zoroastrian service: Not on file     Active member of club or organization: Not on file     Attends meetings of clubs or organizations: Not on file     Relationship status: Not on file     Intimate partner violence     Fear of current or ex partner: Not on file     Emotionally abused: Not on file     Physically abused: Not on file     Forced sexual activity: Not on file   Other Topics Concern     Parent/sibling w/ CABG, MI or angioplasty before 65F 55M? Not Asked   Social History Narrative     Not on file          MEDICATIONS:  has a current medication list which includes the following prescription(s): acetaminophen, cholecalciferol, metformin, and multivitamin w/minerals.    ROS     ROS: 10 point ROS neg other than the symptoms noted above in the HPI.    Physical Exam   VS: There were no vitals taken for this visit.  GENERAL: AXOX3, NAD, well dressed, answering questions appropriately, appears stated age.  HEENT: no exophthalmos, no proptosis, no lig lag, no retraction  CV: regular rate.  LUNGS: normal respiratory effort  NEUROLOGY: CN grossly intact, no tremors  MSK: grossly intact  EXTREMITIES: No edema    LABS:  A1c:    7.1% (12/7/2020 at [facility])  Component      Latest Ref Rng & Units 6/27/2018 5/15/2019 11/15/2019   Hemoglobin A1C      0 - 5.6 % 6.3 (H) 6.3 (H) 6.2 (H)     Basic Metabolic Panel:  !COMPREHENSIVE Latest Ref Rng & Units 5/15/2020   SODIUM 133 - 144 mmol/L 135   POTASSIUM 3.4 - 5.3 mmol/L 4.1   CHLORIDE 94 - 109 mmol/L 102   BUN 7 - 30 mg/dL 10   Creatinine 0.52 - 1.04 mg/dL 0.61   Glucose 70 - 99 mg/dL 145 (H)   ANION GAP 3 - 14 mmol/L 7   CALCIUM 8.5 - 10.1 mg/dL 8.8     LFTS/Cholesterol Panel:  !LIPID/HEPATIC Latest Ref Rng & Units 5/15/2020   CHOLESTEROL <200 mg/dL 211 (H)   TRIGLYCERIDES <150 mg/dL 716 (H)   HDL CHOLESTEROL >49 mg/dL 33 (L)   LDL CHOLESTEROL DIRECT <100 mg/dL 95   LDL CHOLESTEROL, CALCULATED <100 mg/dL Cannot estimate LDL when triglyceride exceeds 400 mg/dL   NON HDL CHOLESTEROL <130 mg/dL 178 (H)   AST 0 - 45 U/L 43   ALT 0 - 50 U/L 58 (H)     Thyroid Function:   !THYROID Latest Ref Rng & Units 5/15/2020   TSH 0.40 - 4.00 mU/L 2.86     Urine MicroAlbumin:  Component      Latest Ref Rng & Units 5/15/2020   Creatinine Urine      mg/dL 118   Albumin Urine mg/L      mg/L 13   Albumin Urine mg/g Cr      0 - 25 mg/g Cr 11.10     Vitamin D:    All pertinent notes, labs, and images personally reviewed by me.     A/P  Ms.Amber JOCELYNE Jarvis is a 40 year old evaluated via phone visit for the management of diabetes:    1. DM2 - Reasonable control. A1c minimally above goal at 7.1%.  A1c trending upward, 6.2-->6.7-->7.1% which is concerning.    Diabetes was newly diagnosed in 2018.  History of GD x 3  Continues to c/o pain in her feet.  Possible diabetic neuropathy but unlikely given her length of diagnosis and historic level of control.  Evaluation by podiatry unremarkable.  Discussed potential benefit of alpha lipoic acid 600 mg daily for the treatment of diabetic neuropathy.   If symptoms continue to worsen, recommend evaluation by neurology.  Continue Metformin XR 2000 mg/day.  Recommend she  intensify her diet efforts due to A1c trending upward - recommend a controlled carbohydrate diet, no more than 45-60 grams per meal, no more than 180 grams CHO per day.  Also recommend weight loss and daily exercise - both should improve her glucose control although acknowledge that exercise is difficult due to foot pain.   If glucose control/a1c does not improve despite diet and exercise efforts, recommend additional medication therapy if A1c increases into the high 7% range.     Will likely require insulin with pregnancy as last two pregnancies were insulin treated.    F/u in 6 months - PCP or endo, her preference.    Labs ordered today:   No orders of the defined types were placed in this encounter.      Radiology/Consults ordered today:        Follow-up:  6 months endo or PCP    Dawn Jean NP  Endocrinology  [facility]  CC:    Phone call duration: 15 minutes.      Again, thank you for allowing me to participate in the care of your patient.        Sincerely,        CYRIL Duque CNP

## 2020-12-11 NOTE — PROGRESS NOTES
"Donna Jarvis is a 40 year old female who is being evaluated via a billable telephone visit.      The patient has been notified of following:     \"This telephone visit will be conducted via a call between you and your physician/provider. We have found that certain health care needs can be provided without the need for a physical exam.  This service lets us provide the care you need with a short phone conversation.  If a prescription is necessary we can send it directly to your pharmacy.  If lab work is needed we can place an order for that and you can then stop by our lab to have the test done at a later time.    Telephone visits are billed at different rates depending on your insurance coverage. During this emergency period, for some insurers they may be billed the same as an in-person visit.  Please reach out to your insurance provider with any questions.    If during the course of the call the physician/provider feels a telephone visit is not appropriate, you will not be charged for this service.\"    Patient has given verbal consent for Telephone visit?  Yes    What phone number would you like to be contacted at? [phone]    How would you like to obtain your AVS? MyChart    Name: Donna Jarvis  Seen at the request of no referring provider for Diabetes (Last seen 11/15/2019).  HPI:  Donna Jarvis is a 40 year old female who presents via phone visit for the management of:    1. Type 2 DM:  Originally diagnosed: 2018  History of GD x3.  Treated with glyburide during the first pregnancy but this was discontinued during the pregnancy due to frequent hypoglycemia and she was diet treated the rest of the pregnancy.  Second and third pregnancy were insulin treated.    Current Regimen: Metformin XR 2000 mg every day  July 2018: Had uncontrolled diarrhea from Ozempic during the night while she was asleep - this was immediately discontinued.    BS checks: 0-2 times per day.    Meter Download: [employer]. 30-day avg BG " 150, 90-day average 153, range      History of recurrent kidney stones, required multiple stents during pregnancy.    Considering one more pregnancy at some point in the future    Complications:   Diabetes Complications  Description / Detail    Diabetic Retinopathy  No retinopathy - last exam 11/27/2019   CAD / PAD  No   Neuropathy   + numbness and pain/discomfort in feet - podiatry eval unremarkable.   Nephropathy / Microalbuminuria  None known   Gastroparesis  no   Hypoglycemia Unawareness  no     2. Blood Pressure:   BP Readings from Last 3 Encounters:   11/15/19 122/80   05/15/19 112/74   06/27/18 112/76   Blood pressure medications include none  3. Lipids: Takes no medications for lipid control.     4. Prevention:  Flu Shot- thinks she had her flu shot this year but doesn't remember exactly when or where  Pneumovax- recommended, declined  Opthalmology- annually  Dental-recommend semiannually  ASA-No  Smoking- No  PMH/PSH:  Past Medical History:   Diagnosis Date     Diabetes (H)     gestational with both pregnancies     PONV (postoperative nausea and vomiting)      Renal disease     kidney stone     Past Surgical History:   Procedure Laterality Date     CYSTOSCOPY, RETROGRADES, INSERT STENT URETER(S), COMBINED Left 12/29/2015    Procedure: COMBINED CYSTOSCOPY, RETROGRADES, INSERT STENT URETER(S);  Surgeon: Jimi Ibrahim MD;  Location: [facility] OR     ORTHOPEDIC SURGERY       Family Hx:  No family history on file.  Thyroid disease: yes, uncle, ? An aunt        DM2: Yes, father, paternal aunt, PGF, MGM      Autoimmune: DM1, SLE, RA, Vitiligo: cousin with DM1    Social Hx:  Social History     Socioeconomic History     Marital status:      Spouse name: Not on file     Number of children: Not on file     Years of education: Not on file     Highest education level: Not on file   Occupational History     Not on file   Social Needs     Financial resource strain: Not on file     Food insecurity      Worry: Not on file     Inability: Not on file     Transportation needs     Medical: Not on file     Non-medical: Not on file   Tobacco Use     Smoking status: Never Smoker     Smokeless tobacco: Never Used   Substance and Sexual Activity     Alcohol use: No     Drug use: No     Sexual activity: Not on file   Lifestyle     Physical activity     Days per week: Not on file     Minutes per session: Not on file     Stress: Not on file   Relationships     Social connections     Talks on phone: Not on file     Gets together: Not on file     Attends Bahai service: Not on file     Active member of club or organization: Not on file     Attends meetings of clubs or organizations: Not on file     Relationship status: Not on file     Intimate partner violence     Fear of current or ex partner: Not on file     Emotionally abused: Not on file     Physically abused: Not on file     Forced sexual activity: Not on file   Other Topics Concern     Parent/sibling w/ CABG, MI or angioplasty before 65F 55M? Not Asked   Social History Narrative     Not on file          MEDICATIONS:  has a current medication list which includes the following prescription(s): acetaminophen, cholecalciferol, metformin, and multivitamin w/minerals.    ROS     ROS: 10 point ROS neg other than the symptoms noted above in the HPI.    Physical Exam   VS: There were no vitals taken for this visit.  GENERAL: AXOX3, NAD, well dressed, answering questions appropriately, appears stated age.  HEENT: no exophthalmos, no proptosis, no lig lag, no retraction  CV: regular rate.  LUNGS: normal respiratory effort  NEUROLOGY: CN grossly intact, no tremors  MSK: grossly intact  EXTREMITIES: No edema    LABS:  A1c:   7.1% (12/7/2020 at [facility])  Component      Latest Ref Rng & Units 6/27/2018 5/15/2019 11/15/2019   Hemoglobin A1C      0 - 5.6 % 6.3 (H) 6.3 (H) 6.2 (H)     Basic Metabolic Panel:  !COMPREHENSIVE Latest Ref Rng & Units 5/15/2020   SODIUM 133 - 144  mmol/L 135   POTASSIUM 3.4 - 5.3 mmol/L 4.1   CHLORIDE 94 - 109 mmol/L 102   BUN 7 - 30 mg/dL 10   Creatinine 0.52 - 1.04 mg/dL 0.61   Glucose 70 - 99 mg/dL 145 (H)   ANION GAP 3 - 14 mmol/L 7   CALCIUM 8.5 - 10.1 mg/dL 8.8     LFTS/Cholesterol Panel:  !LIPID/HEPATIC Latest Ref Rng & Units 5/15/2020   CHOLESTEROL <200 mg/dL 211 (H)   TRIGLYCERIDES <150 mg/dL 716 (H)   HDL CHOLESTEROL >49 mg/dL 33 (L)   LDL CHOLESTEROL DIRECT <100 mg/dL 95   LDL CHOLESTEROL, CALCULATED <100 mg/dL Cannot estimate LDL when triglyceride exceeds 400 mg/dL   NON HDL CHOLESTEROL <130 mg/dL 178 (H)   AST 0 - 45 U/L 43   ALT 0 - 50 U/L 58 (H)     Thyroid Function:   !THYROID Latest Ref Rng & Units 5/15/2020   TSH 0.40 - 4.00 mU/L 2.86     Urine MicroAlbumin:  Component      Latest Ref Rng & Units 5/15/2020   Creatinine Urine      mg/dL 118   Albumin Urine mg/L      mg/L 13   Albumin Urine mg/g Cr      0 - 25 mg/g Cr 11.10     Vitamin D:    All pertinent notes, labs, and images personally reviewed by me.     A/P  Ms.Amber JOCELYNE Jarvis is a 40 year old evaluated via phone visit for the management of diabetes:    1. DM2 - Reasonable control. A1c minimally above goal at 7.1%.  A1c trending upward, 6.2-->6.7-->7.1% which is concerning.    Diabetes was newly diagnosed in 2018.  History of GD x 3  Continues to c/o pain in her feet.  Possible diabetic neuropathy but unlikely given her length of diagnosis and historic level of control.  Evaluation by podiatry unremarkable.  Discussed potential benefit of alpha lipoic acid 600 mg daily for the treatment of diabetic neuropathy.   If symptoms continue to worsen, recommend evaluation by neurology.  Continue Metformin XR 2000 mg/day.  Recommend she intensify her diet efforts due to A1c trending upward - recommend a controlled carbohydrate diet, no more than 45-60 grams per meal, no more than 180 grams CHO per day.  Also recommend weight loss and daily exercise - both should improve her glucose control  although acknowledge that exercise is difficult due to foot pain.   If glucose control/a1c does not improve despite diet and exercise efforts, recommend additional medication therapy if A1c increases into the high 7% range.     Will likely require insulin with pregnancy as last two pregnancies were insulin treated.    F/u in 6 months - PCP or endo, her preference.    Labs ordered today:   No orders of the defined types were placed in this encounter.      Radiology/Consults ordered today:        Follow-up:  6 months endo or PCP    Dawn Jean NP  Endocrinology  [facility]  CC:    Phone call duration: 15 minutes.

## 2021-07-31 ENCOUNTER — HEALTH MAINTENANCE LETTER (OUTPATIENT)
Age: 41
End: 2021-07-31

## 2021-09-25 ENCOUNTER — HEALTH MAINTENANCE LETTER (OUTPATIENT)
Age: 41
End: 2021-09-25

## 2021-12-11 DIAGNOSIS — E11.9 TYPE 2 DIABETES MELLITUS WITHOUT COMPLICATION, WITHOUT LONG-TERM CURRENT USE OF INSULIN (H): ICD-10-CM

## 2021-12-13 RX ORDER — METFORMIN HCL 500 MG
2000 TABLET, EXTENDED RELEASE 24 HR ORAL
Qty: 360 TABLET | Refills: 0 | OUTPATIENT
Start: 2021-12-13

## 2021-12-13 NOTE — TELEPHONE ENCOUNTER
Called pt, now see endo in [facility], Dawn José Miguel's refill denied, pt will call pharmacy and inform  Loulou Egan RN, BSN  Message handled by CLINIC NURSE.

## 2022-01-15 ENCOUNTER — HEALTH MAINTENANCE LETTER (OUTPATIENT)
Age: 42
End: 2022-01-15

## 2022-03-12 ENCOUNTER — HEALTH MAINTENANCE LETTER (OUTPATIENT)
Age: 42
End: 2022-03-12

## 2022-09-08 ENCOUNTER — APPOINTMENT (OUTPATIENT)
Dept: URBAN - METROPOLITAN AREA CLINIC 253 | Age: 42
Setting detail: DERMATOLOGY
End: 2022-09-14

## 2022-09-08 VITALS — RESPIRATION RATE: 14 BRPM | WEIGHT: 205 LBS | HEIGHT: 65 IN

## 2022-09-08 DIAGNOSIS — K13.0 DISEASES OF LIPS: ICD-10-CM

## 2022-09-08 PROBLEM — L30.9 DERMATITIS, UNSPECIFIED: Status: ACTIVE | Noted: 2022-09-08

## 2022-09-08 PROCEDURE — OTHER MIPS QUALITY: OTHER

## 2022-09-08 PROCEDURE — OTHER COUNSELING: OTHER

## 2022-09-08 PROCEDURE — 99203 OFFICE O/P NEW LOW 30 MIN: CPT

## 2022-09-08 ASSESSMENT — LOCATION SIMPLE DESCRIPTION DERM
LOCATION SIMPLE: RIGHT LIP
LOCATION SIMPLE: LEFT LIP

## 2022-09-08 ASSESSMENT — LOCATION ZONE DERM: LOCATION ZONE: LIP

## 2022-09-08 ASSESSMENT — LOCATION DETAILED DESCRIPTION DERM
LOCATION DETAILED: RIGHT SUPERIOR VERMILION LIP
LOCATION DETAILED: LEFT INFERIOR VERMILION LIP

## 2022-09-08 NOTE — PROCEDURE: COUNSELING
Detail Level: Zone
Patient Specific Counseling (Will Not Stick From Patient To Patient): Reassured her that I don’t see any signs of skin cancer. \\nRecommended Aquaphor and Dr. Damon’s Cortibalm. Samples given.
Detail Level: Detailed

## 2022-09-08 NOTE — HPI: RASH
How Severe Is Your Rash?: moderate
Is This A New Presentation, Or A Follow-Up?: Rash
Additional History: The area started after spending time at the beach. It started with a rough dry spot, then it scabbed, fell off and came back. It was present for one month.  Now well healed. \\n\\nShe has not history of cold sores. \\n\\nShe is diabetic and is slower to heal.

## 2022-12-26 ENCOUNTER — HEALTH MAINTENANCE LETTER (OUTPATIENT)
Age: 42
End: 2022-12-26

## 2023-04-22 ENCOUNTER — HEALTH MAINTENANCE LETTER (OUTPATIENT)
Age: 43
End: 2023-04-22

## 2023-07-15 ENCOUNTER — HEALTH MAINTENANCE LETTER (OUTPATIENT)
Age: 43
End: 2023-07-15

## 2024-02-04 ENCOUNTER — HEALTH MAINTENANCE LETTER (OUTPATIENT)
Age: 44
End: 2024-02-04

## 2024-06-23 ENCOUNTER — HEALTH MAINTENANCE LETTER (OUTPATIENT)
Age: 44
End: 2024-06-23